# Patient Record
Sex: MALE | Race: WHITE | Employment: FULL TIME | URBAN - METROPOLITAN AREA
[De-identification: names, ages, dates, MRNs, and addresses within clinical notes are randomized per-mention and may not be internally consistent; named-entity substitution may affect disease eponyms.]

---

## 2018-05-04 ENCOUNTER — HOSPITAL ENCOUNTER (OUTPATIENT)
Dept: CT IMAGING | Age: 58
Discharge: HOME | End: 2018-05-06
Payer: COMMERCIAL

## 2018-05-04 ENCOUNTER — HOSPITAL ENCOUNTER (OUTPATIENT)
Age: 58
Discharge: HOME | End: 2018-05-06
Payer: COMMERCIAL

## 2018-05-04 DIAGNOSIS — F17.210 CIGARETTE SMOKER: ICD-10-CM

## 2018-05-04 PROCEDURE — G0297 LDCT FOR LUNG CA SCREEN: HCPCS

## 2018-05-07 ENCOUNTER — TELEPHONE (OUTPATIENT)
Dept: CASE MANAGEMENT | Age: 58
End: 2018-05-07

## 2018-11-07 ENCOUNTER — TELEPHONE (OUTPATIENT)
Dept: CASE MANAGEMENT | Age: 58
End: 2018-11-07

## 2018-11-07 NOTE — LETTER
CT Lung Screening/ Lung Nodule Program  [address] [facility]. [place], [address]       TO:   Good Mcdowell MD    FAX:   [phone]        FROM:   [place], [place] Lung Screening Program    DATE:   11/7/2018    PHONE:  [phone]    FAX:    [phone]      Comments: Follow up reminder  RE:  Alcira Ball  1960          The information contained in this fax message is intended only for Personal and Confidential use of the designated recipient(s) names above. This information is to be handled as Privileged and Confidential. If the reader of this message is not the intended recipient, you are hereby notified that you have received this document in error, and that any review, dissemination, distribution, or copying of this message is strictly prohibited. If you receive this communication in error, please notify us immediately at the above number and return the original message to us by mail. Thank you. Member of [facility]. Lung Nodule Center            11/7/2018        RE: Isaias Kebede  [address] [facility]      MD TERESA [address]/ [address] [address]      Dear Good Mcdowell MD,    Our records indicate your patient, Alcira Ball had a CT Lung Screening done on 5/4/18 and may be due for follow up scans (CT Chest without Contrast)  as recommended by the American College of Radiology's Lung Rads Assessment for Pulmonary Nodules. If the patient has had follow up testing done at another facility, please call us and let us know so that we may update our records. Thank you for your commitment to your patients and our [address].  If you have any additional questions or concerns regarding our CT Lung Screening Program or our Lung Nodule Program, please don't hesitate to call me at [phone].         Sincerely,    Pulmonary Nodule Center

## 2018-11-07 NOTE — TELEPHONE ENCOUNTER
No call, encounter opened to process CT Lung Screening.       CT Lung Screen 5/4/18 :  Impression   1. No focal consolidation, pleural effusion or pneumothorax. 2. Bilateral upper lobe pulmonary nodules as described above. Recommend follow-up as per Fleischner criteria below. 3. Minimal to mild pulmonary emphysematous disease. 4. Status post cholecystectomy.       Lung - RADS Category 3 :  Probably benign finding (s) - short term   follow - up suggested, includes nodules with a low likelihood of   becoming a clinically active cancer - Follow-up CT scan in 6 months.             Alvaro Fitzgerald is a  59 y.o. male who is a current smoker with a 45 pack year smoking history.      Letter mailed to patient  5/7/2018 encouraging him  to call his physician for results and follow up recommendations if needed. 11/7/2018 Reminder faxed to Adriana Thompson MD and mailed to patient on 11/7/2018.      PATSY Avila., R.T.(R)(T)  Lung Nodule Navigator  CT Lung Cancer Screening- [facility]

## 2019-02-11 ENCOUNTER — TELEPHONE (OUTPATIENT)
Dept: CASE MANAGEMENT | Age: 59
End: 2019-02-11

## 2019-03-05 ENCOUNTER — TELEPHONE (OUTPATIENT)
Dept: CASE MANAGEMENT | Age: 59
End: 2019-03-05

## 2019-03-25 ENCOUNTER — HOSPITAL ENCOUNTER (OUTPATIENT)
Dept: CT IMAGING | Age: 59
Discharge: HOME | End: 2019-03-27
Payer: COMMERCIAL

## 2019-03-25 DIAGNOSIS — R91.1 LUNG NODULE: ICD-10-CM

## 2019-03-25 DIAGNOSIS — F17.210 CIGARETTE SMOKER: ICD-10-CM

## 2019-03-25 PROCEDURE — 71250 CT THORAX DX C-: CPT

## 2019-05-03 ENCOUNTER — TELEPHONE (OUTPATIENT)
Dept: CASE MANAGEMENT | Age: 59
End: 2019-05-03

## 2019-05-03 NOTE — TELEPHONE ENCOUNTER
No call, encounter opened to process CT Lung Screening.       CT Lung Screen 5/4/18 :  Impression   1. No focal consolidation, pleural effusion or pneumothorax. 2. Bilateral upper lobe pulmonary nodules as described above. Recommend follow-up as per Fleischner criteria below. 3. Minimal to mild pulmonary emphysematous disease. 4. Status post cholecystectomy.       Lung - RADS Category 3 :  Probably benign finding (s) - short term   follow - up suggested, includes nodules with a low likelihood of   becoming a clinically active cancer - Follow-up CT scan in 6 months.             Tara Goldberg Coy is a  61 y. o. male who is a current smoker with a 45 pack year smoking history.      Letter mailed to patient  5/7/2018 encouraging him  to call Mandie Roa for results and follow up recommendations if needed.       11/7/2018 Reminder [facility], [address] [facility] mailed to patient on 11/7/2018.      2/11/2019 - Reminder mailed to patient.        3/5/2019 - Call to Velia Arevalo MD .  Office states patient is due in next week and they will discuss.       3/25/19 CT Chest:  Impression       1. Lung RADS category 2: Benign behavior or appearance. A one-year   follow-up chest CT is recommended for routine annual screening. 2. Coronary atherosclerotic disease.      Meryle Laud, B.S., R.T.(R)(T)  Lung Nodule Navigator  CT Lung Cancer Screening- [facility]

## 2020-07-06 ENCOUNTER — HOSPITAL ENCOUNTER (OUTPATIENT)
Dept: ULTRASOUND IMAGING | Age: 60
Discharge: HOME | End: 2020-07-08
Payer: COMMERCIAL

## 2020-07-06 ENCOUNTER — TELEPHONE (OUTPATIENT)
Dept: CASE MANAGEMENT | Age: 60
End: 2020-07-06

## 2020-07-06 ENCOUNTER — HOSPITAL ENCOUNTER (OUTPATIENT)
Dept: CT IMAGING | Age: 60
Discharge: HOME | End: 2020-07-08
Payer: COMMERCIAL

## 2020-07-06 VITALS — WEIGHT: 170 LBS | HEIGHT: 68 IN | BODY MASS INDEX: 25.76 KG/M2

## 2020-07-06 PROCEDURE — G0297 LDCT FOR LUNG CA SCREEN: HCPCS

## 2020-07-06 PROCEDURE — 76706 US ABDL AORTA SCREEN AAA: CPT

## 2020-07-06 NOTE — TELEPHONE ENCOUNTER
I called the patient and he confirmed his CT lung screening at [facility]  on 7/6/2020 at 4:00 pm.  I reminded the patient to arrive at 3:30 pm, enter through the main entrance, and register. Patient confirmed.             Electronically signed by Radha Belle on 7/6/20 at 10:48 AM EDT

## 2020-07-07 ENCOUNTER — TELEPHONE (OUTPATIENT)
Dept: CASE MANAGEMENT | Age: 60
End: 2020-07-07

## 2020-07-07 NOTE — TELEPHONE ENCOUNTER
No call, encounter opened to process CT Lung Screening. CT Lung Screen: 7/6/2020  Impression   A nodule identified in the left pulmonary apical region has increased   in size, approaching 9 mm maximum diameter. No other significant   findings are identified on either side. The left apical nodule is   approaching the size that should be visible by PET CT imaging, but   this is not desired more feasible at this time, reevaluation in 3   months is recommended for further characterization.           LUNG RADS CATEGORY: 4A- suspicious finding, with the recommendation of   3 month follow-up CT reevaluation, or PET CT scan in the near future     Pack years: 52    Social History     Tobacco Use  Smoking Status: Former Smoker    Start Date: 1972   Quit Date: 06/2019   Types: Cigarettes   Packs/Day: 1.00   Years: 47.00   Pack Years: 52   Smokeless Tobacco: Never Used         Results letter sent to patient via my chart or mailed.      [facility]

## 2020-07-08 ENCOUNTER — TELEPHONE (OUTPATIENT)
Dept: CASE MANAGEMENT | Age: 60
End: 2020-07-08

## 2020-07-08 NOTE — TELEPHONE ENCOUNTER
I called the patient's ordering provider Rip Helen Starr's office and left a message for him or his nurse to call the office back at [phone] to discuss care plan for the patient.       Electronically signed by Jim Kelley on 7/8/20 at 1:53 PM EDT

## 2020-10-19 ENCOUNTER — HOSPITAL ENCOUNTER (OUTPATIENT)
Dept: CT IMAGING | Age: 60
Discharge: HOME | End: 2020-10-21

## 2020-10-19 PROCEDURE — 6360000004 HC RX CONTRAST MEDICATION: Performed by: STUDENT IN AN ORGANIZED HEALTH CARE EDUCATION/TRAINING PROGRAM

## 2020-10-19 PROCEDURE — 2580000003 HC RX 258: Performed by: STUDENT IN AN ORGANIZED HEALTH CARE EDUCATION/TRAINING PROGRAM

## 2020-10-19 PROCEDURE — 71260 CT THORAX DX C+: CPT

## 2020-10-19 RX ORDER — SODIUM CHLORIDE 0.9 % (FLUSH) 0.9 %
10 SYRINGE (ML) INJECTION PRN
Status: DISCONTINUED | OUTPATIENT
Start: 2020-10-19 | End: 2020-10-22 | Stop reason: HOSPADM

## 2020-10-19 RX ADMIN — IOPAMIDOL 75 ML: 755 INJECTION, SOLUTION INTRAVENOUS at 08:23

## 2020-10-19 RX ADMIN — Medication 10 ML: at 08:23

## 2021-10-06 ENCOUNTER — HOSPITAL ENCOUNTER (OUTPATIENT)
Dept: DIABETES SERVICES | Age: 61
Setting detail: THERAPIES SERIES
Discharge: HOME | End: 2021-10-06
Payer: COMMERCIAL

## 2021-10-06 PROCEDURE — G0109 DIAB MANAGE TRN IND/GROUP: HCPCS

## 2021-10-07 ENCOUNTER — HOSPITAL ENCOUNTER (OUTPATIENT)
Dept: DIABETES SERVICES | Age: 61
Setting detail: THERAPIES SERIES
Discharge: HOME | End: 2021-10-07
Payer: COMMERCIAL

## 2021-10-07 PROCEDURE — G0109 DIAB MANAGE TRN IND/GROUP: HCPCS

## 2021-10-07 ASSESSMENT — PROBLEM AREAS IN DIABETES QUESTIONNAIRE (PAID)
COPING WITH COMPLICATIONS OF DIABETES: 0
FEELING DEPRESSED WHEN YOU THINK ABOUT LIVING WITH DIABETES: 1
WORRYING ABOUT THE FUTURE AND THE POSSIBILITY OF SERIOUS COMPLICATIONS: 0
PAID-5 TOTAL SCORE: 2
FEELING SCARED WHEN YOU THINK ABOUT LIVING WITH DIABETES: 1
FEELING THAT DIABETES IS TAKING UP TOO MUCH OF YOUR MENTAL AND PHYSICAL ENERGY EVERY DAY: 0

## 2021-10-07 NOTE — PROGRESS NOTES
Diabetes Self-Management Education Record    Participant Name: Audrey Donovan  Referring Provider: Selena Portillo MD  Assessment/Evaluation Ratings:  1=Needs Instruction   4=Demonstrates Understanding/Competency  2=Needs Review   NC=Not Covered    3=Comprehends Key Points  N/A=Not Applicable  Topics/Learning Objectives Pre-session Assess Date:  Instructor initials/date   Instr. Date  10/6/21 PC  Instructor initials/date Follow-up Post- session Eval Comments   Diabetes disease process & Treatment process:   -Define type of diabetes in simple terms.  - Describe the ABCs of  diabetes management  -Identify own type of diabetes  -Identify lifestyle changes/treatment options  -other:  1 [x] All     []  []  []  []  []  3  10/6/21 PC    Pt came in 20 minute late for class   Developing strategies for Healthy coping/psychosocial issues:    -Describe feelings about living with diabetes  -Identify coping strategies and sources of stress  -Identify support needed & support network available  -Complete PAID-5 Diabetes questionnaire 1 [x] All     []  []  []    []  [facility] completed a Diabetes Self- Management Education Assessment on 10/7/21. Part of our assessment is having the patient complete the PAID (Problem Areas in Diabetes Scale)-5 survey. This tool  measures diabetes-related emotional distress a patient may be feeling. Audrey Donovan scored _2_   A total score of >8 indicates possible diabetes related emotional distress, which warrants further assessment and a referral to mental health professional for psychological support and treatment.            Prevention, detection & treatment of Chronic complications:    -Identify the prevention, detection and treatment for complications including immunizations, preventive eye, foot, dental and renal exams as indicated per the participant's duration of diabetes and health status.  -Define the natural course of diabetes and the relationship of blood glucose levels to long term complications of diabetes. 2 [x] All     []            []  3    Prevention, detection & treatment of acute complications:    -State the causes,signs & symptoms of hyper & hypoglycemia, and prevention & treatment strategies.   -Describe sick day guidelines  DKA /indications for ketone testing &  when to call physician  1 [x] All     []      []    3              -Identify severe weather/situation crisis  & diabetes supplies management  []      Using medications safely:   -State effects of diabetes medicines on blood glucose levels;  -List diabetes medication taken, action & side effects 1 [] All     []  []  2    Insulin/Injectables/glucagon  -Name appropriate injection sites; proper storage; supplies needed;     []       Demonstrate proper technique  []      Monitoring blood glucose, interpreting and using results:   -Identify the purpose of testing   -Identify recommended & personal blood glucose targets & HgbA1C target levels  -State the Importance of logging blood glucose levels for pattern recognition;   -State benefits of reading/using pt generated health data  -Verbalize safe lancet disposal 1 [x] All     []  []    []  []  []  3 .    -Demonstrate proper testing technique  []      Incorporating physical activity into lifestyle:   -State effect of exercise on blood glucose levels;   -State benefits of regular exercise;   -Define safety considerations/food choices if needed.  -Describe contraindications/maintenance of activity.  1 [x] All     []  []    []  []  3    Incorporating nutritional management into lifestyle:   -Describe effect of type, amount & timing of food on blood glucose  -Describe methods for preparing and planning healthy meals  -Correctly read food labels  -Name 3 foods high in Carbohydrate 1 [] All       []    []    []  []      -Plan a carbohydrate-controlled meal based on individualized meal plan  -Demonstrate CHO counting/portion control  1 []  []      Developing strategies for problem solving to promote health/change behavior. -Identify 7 self-care behaviors; Personal health risk factors; Benefits, challenges & strategies for behavioral change and set an individualized goal selection. 1       []      []Nutrition  []Monitoring  []Exercise  []Medication  []Other     Identified Barriers to learning/adherence to self management plan:    None  []  other    Instruction Method:  Lecture/Discussion and Handouts    Supporting Education Materials/Equipment Provided: Self-management manual and Nutritional Packet   []Mexican materials       [] services     []Other:      Encounter Type Date Attended Start Time End Time Comments No Show Dates   Assessment          Session 1 10/6/21 PC 36 2000  in person    Session 2 10/7/21 [address] 1730 2000 In person    1:1 DSMES          In person Follow-up         Gestational Diabetes         Individual MNT        Meter Instrx        Insulin Instrx           Additional Comments: [] Pt seen individually due to Covid-19 Safety precautions and no group session available.         Date:   Follow-up goal attainment based on patients initial DSMES goal    Dr Notified by [] EMR []Fax        []Post class Hgb A1C  []Medication compliance   []Plate method/meal plan compliance   []Able to state the number of Carbohydrate servings eaten at B,L,D   []Testing blood glucose as prescribed by PCP   []Exercise Routine   []Other:   []Other:     []Patient lost to follow-up  Dr Notified by []EMR []Fax     Personal Support Plan:      [] Keep all scheduled doctor appointments   [] Make and keep appointments with specialists (foot, eye, dentist) as recommended   [] Consult my pharmacist about all new medications or to ask any medication questions   [] Get tested for sleep apnea   [] Seek help for:   [] Make an appointment with:   [] Attend smoking cessation classes or call 1-800-QUIT-NOW  [] Attend Diabetes Support Group   [] Use diabetes magazines, books, or credible web-sites like the ADA for more information  [] Increase exercise at home or join an exercise program:   [] Other:

## 2021-10-07 NOTE — LETTER
[facility])- Diabetes Education Department Initial Diabetes Education Letter    10/7/2021       Re:     Paul Gallo         :  1960  Dear Dr. Leonor Miller: Thank you for referring your patient, Paul Gallo, for diabetes education. Paul Gallo has completed their comprehensive education plan today which included the topics below:    Nursing/Medical [x]      Nutrition [x]  [] Diabetes disease process & treatment   [] Diabetes medication use and safety   [] Self-monitoring blood glucose/interpreting results  [] Prevention, detection and treatment of acute complications  [] Prevention, detection and treatment of chronic complications  [] Developing strategies to address psychosocial issues    [] Nutritional management: basic principles   [] Carbohydrate counting, plate method, label reading  [] Benefits of/ways to incorporate physical activity  [] Problem solving and preparing for crisis situations  [] Diabetes supply management  [] Goal setting and behavior modification           PAID -5 (Problem Areas in Diabetes) Survey Results  2  A total score of 8 or greater indicates possible diabetes related emotional distress which warrants further assessment.  [] [address] and Crisis Resource information provided. Comments: 10/7/21KC: Educated on Diabetes Plate Method and healthy food choices. Demonstrated understanding of carb counting using food lists with carbohydrate serving sizes. Pt instructed on 3-4 carbohydrate servings per meal with 1 carbohydrate serving HS. Read CHO content of food correctly on nutrition facts using food label. Discussed lean proteins, low fat, and high fiber. Reviewed low sodium and avoiding added sugars. Reviewed portion sizes and benefits of measuring foods. Patient will be called and was encouraged to meet with a dietitian for individualized meal plan.     PATIENT SELECTED GOAL:   [x]  I will follow my meal plan and measure my carbohydrate foods. []  I will increase my activity to:  []  I will check my blood glucose as ordered by my doctor. []  I will take my medications at the correct times as ordered by my doctor. []  Other:      DIABETES SELF-MANAGEMENT SUPPORT PLAN/REFERRALS (patient identified):  [] Keep my scheduled visits with my doctor   [] Make and keep appointments with specialists (foot, eye, dentist) as recommended  [] Consult my pharmacist with all new medications and/or any medication questions  [] Get tested for sleep apnea  [] Seek help for:   [] Make an appointment with:   [] Attend smoking cessation classes or call help-line (3-811-QUIT-NOW; [phone])  [] Attend a diabetes support group  [] Use diabetes magazines, books, or the American Diabetes Association website (www.diabetes. org) for more information    [] Start or increase exercising at home or join a program:  [] Other: There will be a follow-up in 3 months to evaluate the attainment of their chosen goal, and self identified support plan and you will be notified of their progress. Thank you for referring this patient to our program.  Please do not hesitate to call if you have any questions at [phone] [facility] or [facility]) or [phone] ([address] [place] [address]).         Sincerely,    [facility]) Diabetes Education Department  American Diabetes Association Recognized DSMES Program

## 2021-10-22 ENCOUNTER — HOSPITAL ENCOUNTER (OUTPATIENT)
Dept: DIABETES SERVICES | Age: 61
Setting detail: THERAPIES SERIES
Discharge: HOME | End: 2021-10-22
Payer: COMMERCIAL

## 2021-10-22 PROCEDURE — G0108 DIAB MANAGE TRN  PER INDIV: HCPCS

## 2021-10-22 NOTE — LETTER
to comply with meal plan. Encouraged pt to use measuring cups for portion control. Instructed pt to call with any questions. Patient is encouraged to call with further questions or call and re-schedule other sessions within a year from original date. Thank you for referring this patient to our program.  Please do not hesitate to call if you have any questions at ( (NANCY or MUKESH) or [phone] ([address] [place] [address]).         Sincerely,         Registered Dietitian Nutritionists:          []  ARMINDA Cuellar          [x]   Guanaco Lara MS ARMINDA BARCENAS   []  ARMINDA Trinh  []   ARMINDA Estevez           Diabetes

## 2021-10-22 NOTE — PROGRESS NOTES
Diabetes Self-Management Education Record    Participant Name: Ernesto Smyth  Referring Provider: Lori Sales MD  Assessment/Evaluation Ratings:  1=Needs Instruction   4=Demonstrates Understanding/Competency  2=Needs Review   NC=Not Covered    3=Comprehends Key Points  N/A=Not Applicable  Topics/Learning Objectives Pre-session Assess Date:  Instructor initials/date   Instr. Date  10/6/21 PC  Instructor initials/date Follow-up Post- session Eval Comments   Diabetes disease process & Treatment process:   -Define type of diabetes in simple terms.  - Describe the ABCs of  diabetes management  -Identify own type of diabetes  -Identify lifestyle changes/treatment options  -other:  1 [x] All     []  []  []  []  []  3  10/6/21 PC    Pt came in 20 minute late for class   Developing strategies for Healthy coping/psychosocial issues:    -Describe feelings about living with diabetes  -Identify coping strategies and sources of stress  -Identify support needed & support network available  -Complete PAID-5 Diabetes questionnaire 1 [x] All     []  []  []    []  [facility] completed a Diabetes Self- Management Education Assessment on 10/7/21. Part of our assessment is having the patient complete the PAID (Problem Areas in Diabetes Scale)-5 survey. This tool  measures diabetes-related emotional distress a patient may be feeling. Ernesto Smyth scored _2_   A total score of >8 indicates possible diabetes related emotional distress, which warrants further assessment and a referral to mental health professional for psychological support and treatment.            Prevention, detection & treatment of Chronic complications:    -Identify the prevention, detection and treatment for complications including immunizations, preventive eye, foot, dental and renal exams as indicated per the participant's duration of diabetes and health status.  -Define the natural course of diabetes and the relationship of blood glucose levels to long term complications of diabetes. 2 [x] All     []            []  3    Prevention, detection & treatment of acute complications:    -State the causes,signs & symptoms of hyper & hypoglycemia, and prevention & treatment strategies.   -Describe sick day guidelines  DKA /indications for ketone testing &  when to call physician  1 [x] All     []      []    3              -Identify severe weather/situation crisis  & diabetes supplies management  []      Using medications safely:   -State effects of diabetes medicines on blood glucose levels;  -List diabetes medication taken, action & side effects 1 [] All     []  []  2    Insulin/Injectables/glucagon  -Name appropriate injection sites; proper storage; supplies needed;     []       Demonstrate proper technique  []      Monitoring blood glucose, interpreting and using results:   -Identify the purpose of testing   -Identify recommended & personal blood glucose targets & HgbA1C target levels  -State the Importance of logging blood glucose levels for pattern recognition;   -State benefits of reading/using pt generated health data  -Verbalize safe lancet disposal 1 [x] All     []  []    []  []  []  3 .    -Demonstrate proper testing technique  []      Incorporating physical activity into lifestyle:   -State effect of exercise on blood glucose levels;   -State benefits of regular exercise;   -Define safety considerations/food choices if needed.  -Describe contraindications/maintenance of activity. 1 [x] All     []  []    []  []  3    Incorporating nutritional management into lifestyle:   -Describe effect of type, amount & timing of food on blood glucose  -Describe methods for preparing and planning healthy meals  -Correctly read food labels  -Name 3 foods high in Carbohydrate 1 [x] All       [x]    [x]    [x]  [x]  3 10/7/21KC: Educated on Diabetes Plate Method and healthy food choices. Demonstrated understanding of carb counting using food lists with carbohydrate serving sizes. Pt instructed on 3-4 carbohydrate servings per meal with 1 carbohydrate serving HS. Read CHO content of food correctly on nutrition facts using food label. Discussed lean proteins, low fat, and high fiber. Reviewed low sodium and avoiding added sugars. Reviewed portion sizes and benefits of measuring foods. Patient will be called and was encouraged to meet with a dietitian for individualized meal plan. -Plan a carbohydrate-controlled meal based on individualized meal plan  -Demonstrate CHO counting/portion control  1 [x]  [x]  3 10/22/21KC: Instructed pt on 1600 calorie diet with 12 total carbohydrate servings per day. 3 carbohydrate choices for breakfast, 3 carbohydrate choices for lunch, 4 carbohydrate choices for dinner, 1 carbohydrate choices for PM/HS snack. Discussed recommended weight goals and water intakes. Discussed different types of carbohydrates and choosing higher quality carbohydrates with more fiber. Educated pt on lean proteins and low fat. Educated pt on label reading and how to apply grams of total carbohydrates into carbohydrate servings to comply with meal plan. Encouraged pt to use measuring cups for portion control. Instructed pt to call with any questions. Developing strategies for problem solving to promote health/change behavior. -Identify 7 self-care behaviors; Personal health risk factors; Benefits, challenges & strategies for behavioral change and set an individualized goal selection.  1       [x]  3  10/7/21KC  [x]Nutrition  []Monitoring  []Exercise  []Medication  []Other     Identified Barriers to learning/adherence to self management plan:    None  []  other    Instruction Method:  Lecture/Discussion and Handouts    Supporting Education Materials/Equipment Provided: Self-management manual and Nutritional Packet   []Maltese materials       [] services     []Other:      Encounter Type Date Attended Start Time End Time Comments No Show Dates   Assessment          Session 1 10/6/21 PC 1800 2000  in person    Session 2 10/7/21 [address] 1800 2000 In person    Session 3 10/22/21KC 1200 1230 telehealth    In person Follow-up         Gestational Diabetes         Individual MNT        Meter Instrx        Insulin Instrx           Additional Comments: [] Pt seen individually due to Covid-19 Safety precautions and no group session available.     Patient came 30 minutes late to both class days    Date:   Follow-up goal attainment based on patients initial DSMES goal    Dr Notified by [] EMR []Fax        []Post class Hgb A1C  []Medication compliance   []Plate method/meal plan compliance   []Able to state the number of Carbohydrate servings eaten at B,L,D   []Testing blood glucose as prescribed by PCP   []Exercise Routine   []Other:   []Other:     []Patient lost to follow-up   Notified by []EMR []Fax     Personal Support Plan:      [] Keep all scheduled doctor appointments   [] Make and keep appointments with specialists (foot, eye, dentist) as recommended   [] Consult my pharmacist about all new medications or to ask any medication questions   [] Get tested for sleep apnea   [] Seek help for:   [] Make an appointment with:   [] Attend smoking cessation classes or call 7-800-QUIT-NOW  [] Attend Diabetes Support Group   [] Use diabetes magazines, books, or credible web-sites like the ADA for more information  [] Increase exercise at home or join an exercise program:   [] Other:

## 2021-11-11 ENCOUNTER — TELEPHONE (OUTPATIENT)
Dept: DIABETES SERVICES | Age: 61
End: 2021-11-11

## 2021-11-13 ENCOUNTER — HOSPITAL ENCOUNTER (OUTPATIENT)
Dept: ULTRASOUND IMAGING | Age: 61
Discharge: HOME | End: 2021-11-15
Payer: COMMERCIAL

## 2021-11-13 ENCOUNTER — HOSPITAL ENCOUNTER (OUTPATIENT)
Age: 61
Discharge: HOME | End: 2021-11-13
Payer: COMMERCIAL

## 2021-11-13 ENCOUNTER — HOSPITAL ENCOUNTER (OUTPATIENT)
Dept: CT IMAGING | Age: 61
Discharge: HOME | End: 2021-11-15
Payer: COMMERCIAL

## 2021-11-13 DIAGNOSIS — R91.1 SOLITARY LUNG NODULE: ICD-10-CM

## 2021-11-13 DIAGNOSIS — Z13.6 ENCOUNTER FOR SCREENING FOR CARDIOVASCULAR DISORDERS: ICD-10-CM

## 2021-11-13 LAB
ANION GAP SERPL CALCULATED.3IONS-SCNC: 10 MMOL/L (ref 7–16)
BUN BLDV-MCNC: 22 MG/DL (ref 6–23)
CALCIUM SERPL-MCNC: 9.6 MG/DL (ref 8.6–10.2)
CHLORIDE BLD-SCNC: 104 MMOL/L (ref 98–107)
CO2: 25 MMOL/L (ref 22–29)
CREAT SERPL-MCNC: 1 MG/DL (ref 0.7–1.2)
GFR AFRICAN AMERICAN: >60
GFR NON-AFRICAN AMERICAN: >60 ML/MIN/1.73
GLUCOSE BLD-MCNC: 135 MG/DL (ref 74–99)
POTASSIUM SERPL-SCNC: 4.7 MMOL/L (ref 3.5–5)
SODIUM BLD-SCNC: 139 MMOL/L (ref 132–146)

## 2021-11-13 PROCEDURE — 80048 BASIC METABOLIC PNL TOTAL CA: CPT

## 2021-11-13 PROCEDURE — 71260 CT THORAX DX C+: CPT

## 2021-11-13 PROCEDURE — 6360000004 HC RX CONTRAST MEDICATION: Performed by: RADIOLOGY

## 2021-11-13 PROCEDURE — 76775 US EXAM ABDO BACK WALL LIM: CPT

## 2021-11-13 PROCEDURE — 36415 COLL VENOUS BLD VENIPUNCTURE: CPT

## 2021-11-13 RX ADMIN — IOPAMIDOL 75 ML: 755 INJECTION, SOLUTION INTRAVENOUS at 09:22

## 2021-12-20 ENCOUNTER — TELEPHONE (OUTPATIENT)
Dept: VASCULAR SURGERY | Age: 61
End: 2021-12-20

## 2021-12-21 ENCOUNTER — OFFICE VISIT (OUTPATIENT)
Dept: VASCULAR SURGERY | Age: 61
End: 2021-12-21
Payer: COMMERCIAL

## 2021-12-21 VITALS
RESPIRATION RATE: 18 BRPM | BODY MASS INDEX: 25.46 KG/M2 | HEART RATE: 94 BPM | HEIGHT: 68 IN | WEIGHT: 168 LBS | SYSTOLIC BLOOD PRESSURE: 126 MMHG | OXYGEN SATURATION: 98 % | DIASTOLIC BLOOD PRESSURE: 78 MMHG

## 2021-12-21 DIAGNOSIS — I71.40 AAA (ABDOMINAL AORTIC ANEURYSM) WITHOUT RUPTURE: Primary | ICD-10-CM

## 2021-12-21 PROCEDURE — 99204 OFFICE O/P NEW MOD 45 MIN: CPT | Performed by: SURGERY

## 2021-12-21 RX ORDER — SIMVASTATIN 5 MG
5 TABLET ORAL NIGHTLY
COMMUNITY
End: 2022-09-13 | Stop reason: ALTCHOICE

## 2021-12-21 NOTE — PROGRESS NOTES
Vascular Surgery Outpatient Consultation      Chief Complaint   Patient presents with    Consultation     AAA,        Reason for Consult:  Abdominal aortic aneursym    Requesting Physician:  Dr. Nathanael Pisano:                The patient is a 59 y.o. male who is referred for evaluation of abdominal aortic aneursym. He was being followed by Dr. Khadra Chaparro for the AAA, but has been referred since the aneurysm is now 4.8 cm. Past Medical History:    History reviewed. No pertinent past medical history. Past Surgical History:    History reviewed. No pertinent surgical history. Current Medications:   Prior to Admission medications    Medication Sig Start Date End Date Taking?  Authorizing Provider   simvastatin (ZOCOR) 5 MG tablet Take 5 mg by mouth nightly    Historical Provider, MD   metFORMIN (GLUCOPHAGE) 500 MG tablet Take 500 mg by mouth 2 times daily 21   Historical Provider, MD     Allergies:  Penicillins    Social History     Socioeconomic History    Marital status:      Spouse name: Not on file    Number of children: Not on file    Years of education: Not on file    Highest education level: Not on file   Occupational History    Not on file   Tobacco Use    Smoking status: Former Smoker     Packs/day: 1.00     Years: 47.00     Pack years: 47.00     Types: Cigarettes     Start date: 0     Quit date: 2019     Years since quittin.5    Smokeless tobacco: Never Used    Tobacco comment: Per CT Lung Screening order   Vaping Use    Vaping Use: Never used   Substance and Sexual Activity    Alcohol use: Not Currently    Drug use: Not Currently    Sexual activity: Not on file   Other Topics Concern    Not on file   Social History Narrative    Not on file     Social Determinants of Health     Financial Resource Strain:     Difficulty of Paying Living Expenses: Not on file   Food Insecurity:     Worried About Running Out of Food in the Last Year: Not on file   Catherine Qureshi Ran Out of Food in the Last Year: Not on file   Transportation Needs:     Lack of Transportation (Medical): Not on file    Lack of Transportation (Non-Medical): Not on file   Physical Activity:     Days of Exercise per Week: Not on file    Minutes of Exercise per Session: Not on file   Stress:     Feeling of Stress : Not on file   Social Connections:     Frequency of Communication with Friends and Family: Not on file    Frequency of Social Gatherings with Friends and Family: Not on file    Attends Rastafari Services: Not on file    Active Member of [address] [place] [address] [employer] or Organizations: Not on file    Attends Club or Organization Meetings: Not on file    Marital Status: Not on file   Intimate Partner Violence:     Fear of Current or Ex-Partner: Not on file    Emotionally Abused: Not on file    Physically Abused: Not on file    Sexually Abused: Not on file   Housing Stability:     Unable to Pay for Housing in the Last Year: Not on file    Number of [place] in the Last Year: Not on file    Unstable Housing in the Last Year: Not on file        History reviewed. No pertinent family history.     REVIEW OF SYSTEMS (New symptoms):    Eyes:      Blurred vision:  No [x]/Yes []               Diplopia:   No [x]/Yes []               Vision loss:       No [x]/Yes []   Ears, nose, throat:             Hearing loss:    No [x]/Yes []      Vertigo:   No [x]/Yes []                       Swallowing problem:  No [x]/Yes []               Nose bleeds:   No [x]/Yes []      Voice hoarseness:  No [x]/Yes []  Respiratory:             Cough:   No [x]/Yes []      Pleuritic chest pain:  No [x]/Yes []                        Dyspnea:   No [x]/Yes []      Wheezing:   No [x]/Yes []  Cardiovascular:             Angina:   No [x]/Yes []      Palpitations:   No [x]/Yes []          Claudication:    No [x]/Yes []      Leg swelling:   No [x]/Yes []  Gastrointestinal:             Nausea or vomiting:  No [x]/Yes []               Abdominal pain:  No [x]/Yes []                     Intestinal bleeding: No [x]/Yes []  Musculoskeletal:             Leg pain:   No [x]/Yes []      Back pain:   No [x]/Yes []                    Weakness:   No [x]/Yes []  Neurologic:             Numbness:   No [x]/Yes []      Paralysis:   No [x]/Yes []                       Headaches:   No [x]/Yes []  Hematologic, lymphatic:   Anemia:   No [x]/Yes []              Bleeding or bruising:  No [x]/Yes []              Fevers or chills: No [x]/Yes []  Endocrine:             Temp intolerance:   No [x]/Yes []                       Polydipsia, polyuria:  No [x]/Yes []  Skin:              Rash:    No [x]/Yes []      Ulcers:   No [x]/Yes []              Abnorm pigment: No [x]/Yes []  :              Frequency/urgency:  No [x]/Yes []      Hematuria:    No [x]/Yes []                      Incontinence:    No [x]/Yes []    PHYSICAL EXAM:  Vitals:    12/21/21 1501   BP: 126/78   Pulse: 94   Resp: 18   SpO2: 98%     General Appearance: alert and oriented to person, place and time, well developed and well- nourished, in no acute distress  Skin: warm and dry, no rash or erythema  Head: normocephalic and atraumatic  Eyes: extraocular eye movements intact, conjunctivae normal  ENT: external ear and ear canal normal bilaterally, nose without deformity  Pulmonary/Chest: clear to auscultation bilaterally- no wheezes, rales or rhonchi, normal air movement, no respiratory distress  Cardiovascular: normal rate, regular rhythm, normal S1 and S2, no murmurs, no carotid bruits  Abdomen: soft, non-tender, non-distended, normal bowel sounds, no masses or organomegaly  Musculoskeletal: normal range of motion, no joint swelling, deformity or tenderness  Neurologic: no cranial nerve deficit, gait, coordination and speech normal  Extremities: no leg edema bilaterally    PULSE EXAM      Right      Left   Brachial     Radial     Femoral     Popliteal     Dorsalis Pedis     Posterior Tibial     (3=normal, 2=diminished,

## 2022-01-19 NOTE — PROGRESS NOTES
Diabetes Self-Management Education Record    Participant Name: Jeniffer Spears  Referring Provider: Nicolás Espinosa MD  Assessment/Evaluation Ratings:  1=Needs Instruction   4=Demonstrates Understanding/Competency  2=Needs Review   NC=Not Covered    3=Comprehends Key Points  N/A=Not Applicable  Topics/Learning Objectives Pre-session Assess Date:  Instructor initials/date   Instr. Date  10/6/21 PC  Instructor initials/date Follow-up Post- session Eval Comments   Diabetes disease process & Treatment process:   -Define type of diabetes in simple terms.  - Describe the ABCs of  diabetes management  -Identify own type of diabetes  -Identify lifestyle changes/treatment options  -other:  1 [x] All     []  []  []  []  []  3  10/6/21 PC    Pt came in 20 minute late for class   Developing strategies for Healthy coping/psychosocial issues:    -Describe feelings about living with diabetes  -Identify coping strategies and sources of stress  -Identify support needed & support network available  -Complete PAID-5 Diabetes questionnaire 1 [x] All     []  []  []    []  [facility] completed a Diabetes Self- Management Education Assessment on 10/7/21. Part of our assessment is having the patient complete the PAID (Problem Areas in Diabetes Scale)-5 survey. This tool  measures diabetes-related emotional distress a patient may be feeling. Jeniffer Spears scored _2_   A total score of >8 indicates possible diabetes related emotional distress, which warrants further assessment and a referral to mental health professional for psychological support and treatment.            Prevention, detection & treatment of Chronic complications:    -Identify the prevention, detection and treatment for complications including immunizations, preventive eye, foot, dental and renal exams as indicated per the participant's duration of diabetes and health status.  -Define the natural course of diabetes and the relationship of blood glucose levels to long term complications of diabetes. 2 [x] All     []            []  3    Prevention, detection & treatment of acute complications:    -State the causes,signs & symptoms of hyper & hypoglycemia, and prevention & treatment strategies.   -Describe sick day guidelines  DKA /indications for ketone testing &  when to call physician  1 [x] All     []      []    3              -Identify severe weather/situation crisis  & diabetes supplies management  []      Using medications safely:   -State effects of diabetes medicines on blood glucose levels;  -List diabetes medication taken, action & side effects 1 [] All     []  []  2    Insulin/Injectables/glucagon  -Name appropriate injection sites; proper storage; supplies needed;     []       Demonstrate proper technique  []      Monitoring blood glucose, interpreting and using results:   -Identify the purpose of testing   -Identify recommended & personal blood glucose targets & HgbA1C target levels  -State the Importance of logging blood glucose levels for pattern recognition;   -State benefits of reading/using pt generated health data  -Verbalize safe lancet disposal 1 [x] All     []  []    []  []  []  3 .    -Demonstrate proper testing technique  []      Incorporating physical activity into lifestyle:   -State effect of exercise on blood glucose levels;   -State benefits of regular exercise;   -Define safety considerations/food choices if needed.  -Describe contraindications/maintenance of activity. 1 [x] All     []  []    []  []  3    Incorporating nutritional management into lifestyle:   -Describe effect of type, amount & timing of food on blood glucose  -Describe methods for preparing and planning healthy meals  -Correctly read food labels  -Name 3 foods high in Carbohydrate 1 [x] All       [x]    [x]    [x]  [x]  3 10/7/21KC: Educated on Diabetes Plate Method and healthy food choices. Demonstrated understanding of carb counting using food lists with carbohydrate serving sizes. Pt instructed on 3-4 carbohydrate servings per meal with 1 carbohydrate serving HS. Read CHO content of food correctly on nutrition facts using food label. Discussed lean proteins, low fat, and high fiber. Reviewed low sodium and avoiding added sugars. Reviewed portion sizes and benefits of measuring foods. Patient will be called and was encouraged to meet with a dietitian for individualized meal plan. -Plan a carbohydrate-controlled meal based on individualized meal plan  -Demonstrate CHO counting/portion control  1 [x]  [x]  3 10/22/21KC: Instructed pt on 1600 calorie diet with 12 total carbohydrate servings per day. 3 carbohydrate choices for breakfast, 3 carbohydrate choices for lunch, 4 carbohydrate choices for dinner, 1 carbohydrate choices for PM/HS snack. Discussed recommended weight goals and water intakes. Discussed different types of carbohydrates and choosing higher quality carbohydrates with more fiber. Educated pt on lean proteins and low fat. Educated pt on label reading and how to apply grams of total carbohydrates into carbohydrate servings to comply with meal plan. Encouraged pt to use measuring cups for portion control. Instructed pt to call with any questions. Developing strategies for problem solving to promote health/change behavior. -Identify 7 self-care behaviors; Personal health risk factors; Benefits, challenges & strategies for behavioral change and set an individualized goal selection.  1       [x]  3  10/7/21KC  [x]Nutrition  []Monitoring  []Exercise  []Medication  []Other     Identified Barriers to learning/adherence to self management plan:    None  []  other    Instruction Method:  Lecture/Discussion and Handouts    Supporting Education Materials/Equipment Provided: Self-management manual and Nutritional Packet   []Pakistani materials       [] services     []Other:      Encounter Type Date Attended Start Time End Time Comments No Show Dates   Assessment          Session 1 10/6/21 PC 1800 2000  in person    Session 2 10/7/21 [address] 1800 2000 In person    Session 3 10/22/21KC 1200 1230 telehealth    In person Follow-up         Gestational Diabetes         Individual MNT        Meter Instrx        Insulin Instrx           Additional Comments: [] Pt seen individually due to Covid-19 Safety precautions and no group session available.     Patient came 30 minutes late to both class days    Date:  1/10/22 Follow-up goal attainment based on patients initial DSMES goal   Met  Dr Notified by [x] EMR []Fax        []Post class Hgb A1C  []Medication compliance   [x]Plate method/meal plan compliance   []Able to state the number of Carbohydrate servings eaten at B,L,D   []Testing blood glucose as prescribed by PCP   []Exercise Routine   []Other:   []Other:     []Patient lost to follow-up  Dr Notified by []EMR []Fax     Personal Support Plan:      [x] Keep all scheduled doctor appointments   [] Make and keep appointments with specialists (foot, eye, dentist) as recommended   [] Consult my pharmacist about all new medications or to ask any medication questions   [] Get tested for sleep apnea   [] Seek help for:   [] Make an appointment with:   [] Attend smoking cessation classes or call 1-800-QUIT-NOW  [] Attend Diabetes Support Group   [] Use diabetes magazines, books, or credible web-sites like the ADA for more information  [] Increase exercise at home or join an exercise program:   [] Other:

## 2022-04-13 ENCOUNTER — HOSPITAL ENCOUNTER (EMERGENCY)
Age: 62
Discharge: HOME | End: 2022-04-13
Attending: EMERGENCY MEDICINE
Payer: COMMERCIAL

## 2022-04-13 ENCOUNTER — APPOINTMENT (OUTPATIENT)
Dept: GENERAL RADIOLOGY | Age: 62
End: 2022-04-13
Payer: COMMERCIAL

## 2022-04-13 VITALS
WEIGHT: 169 LBS | DIASTOLIC BLOOD PRESSURE: 64 MMHG | HEIGHT: 68 IN | HEART RATE: 94 BPM | SYSTOLIC BLOOD PRESSURE: 108 MMHG | TEMPERATURE: 97.9 F | OXYGEN SATURATION: 98 % | RESPIRATION RATE: 16 BRPM | BODY MASS INDEX: 25.61 KG/M2

## 2022-04-13 DIAGNOSIS — R11.2 NAUSEA VOMITING AND DIARRHEA: Primary | ICD-10-CM

## 2022-04-13 DIAGNOSIS — R19.7 NAUSEA VOMITING AND DIARRHEA: Primary | ICD-10-CM

## 2022-04-13 DIAGNOSIS — R91.8 MASS OF LEFT LUNG: ICD-10-CM

## 2022-04-13 LAB
ALBUMIN SERPL-MCNC: 4.4 G/DL (ref 3.5–5.2)
ALP BLD-CCNC: 75 U/L (ref 40–129)
ALT SERPL-CCNC: 47 U/L (ref 0–40)
ANION GAP SERPL CALCULATED.3IONS-SCNC: 16 MMOL/L (ref 7–16)
APTT: 27.9 SEC (ref 24.5–35.1)
AST SERPL-CCNC: 29 U/L (ref 0–39)
BASOPHILS ABSOLUTE: 0.01 E9/L (ref 0–0.2)
BASOPHILS RELATIVE PERCENT: 0.1 % (ref 0–2)
BILIRUB SERPL-MCNC: 0.8 MG/DL (ref 0–1.2)
BUN BLDV-MCNC: 32 MG/DL (ref 6–23)
CALCIUM SERPL-MCNC: 9.7 MG/DL (ref 8.6–10.2)
CHLORIDE BLD-SCNC: 95 MMOL/L (ref 98–107)
CO2: 20 MMOL/L (ref 22–29)
CREAT SERPL-MCNC: 1.1 MG/DL (ref 0.7–1.2)
EOSINOPHILS ABSOLUTE: 0 E9/L (ref 0.05–0.5)
EOSINOPHILS RELATIVE PERCENT: 0 % (ref 0–6)
GFR AFRICAN AMERICAN: >60
GFR NON-AFRICAN AMERICAN: >60 ML/MIN/1.73
GLUCOSE BLD-MCNC: 206 MG/DL (ref 74–99)
HCT VFR BLD CALC: 42.6 % (ref 37–54)
HEMOGLOBIN: 15.1 G/DL (ref 12.5–16.5)
IMMATURE GRANULOCYTES #: 0.06 E9/L
IMMATURE GRANULOCYTES %: 0.5 % (ref 0–5)
INR BLD: 1
LACTIC ACID, SEPSIS: 1.2 MMOL/L (ref 0.5–1.9)
LIPASE: 50 U/L (ref 13–60)
LYMPHOCYTES ABSOLUTE: 1.43 E9/L (ref 1.5–4)
LYMPHOCYTES RELATIVE PERCENT: 12.3 % (ref 20–42)
MAGNESIUM: 1.9 MG/DL (ref 1.6–2.6)
MCH RBC QN AUTO: 28.7 PG (ref 26–35)
MCHC RBC AUTO-ENTMCNC: 35.4 % (ref 32–34.5)
MCV RBC AUTO: 81 FL (ref 80–99.9)
MONOCYTES ABSOLUTE: 0.52 E9/L (ref 0.1–0.95)
MONOCYTES RELATIVE PERCENT: 4.5 % (ref 2–12)
NEUTROPHILS ABSOLUTE: 9.56 E9/L (ref 1.8–7.3)
NEUTROPHILS RELATIVE PERCENT: 82.6 % (ref 43–80)
PDW BLD-RTO: 13.2 FL (ref 11.5–15)
PLATELET # BLD: 265 E9/L (ref 130–450)
PMV BLD AUTO: 10.3 FL (ref 7–12)
POTASSIUM SERPL-SCNC: 4.7 MMOL/L (ref 3.5–5)
PROTHROMBIN TIME: 10.8 SEC (ref 9.3–12.4)
RBC # BLD: 5.26 E12/L (ref 3.8–5.8)
SODIUM BLD-SCNC: 131 MMOL/L (ref 132–146)
TOTAL PROTEIN: 7.5 G/DL (ref 6.4–8.3)
WBC # BLD: 11.6 E9/L (ref 4.5–11.5)

## 2022-04-13 PROCEDURE — 83735 ASSAY OF MAGNESIUM: CPT

## 2022-04-13 PROCEDURE — 85025 COMPLETE CBC W/AUTO DIFF WBC: CPT

## 2022-04-13 PROCEDURE — 6370000000 HC RX 637 (ALT 250 FOR IP): Performed by: NURSE PRACTITIONER

## 2022-04-13 PROCEDURE — 36415 COLL VENOUS BLD VENIPUNCTURE: CPT

## 2022-04-13 PROCEDURE — 99283 EMERGENCY DEPT VISIT LOW MDM: CPT

## 2022-04-13 PROCEDURE — 85610 PROTHROMBIN TIME: CPT

## 2022-04-13 PROCEDURE — 71046 X-RAY EXAM CHEST 2 VIEWS: CPT

## 2022-04-13 PROCEDURE — 2580000003 HC RX 258: Performed by: EMERGENCY MEDICINE

## 2022-04-13 PROCEDURE — 96360 HYDRATION IV INFUSION INIT: CPT

## 2022-04-13 PROCEDURE — 83605 ASSAY OF LACTIC ACID: CPT

## 2022-04-13 PROCEDURE — 83690 ASSAY OF LIPASE: CPT

## 2022-04-13 PROCEDURE — 93005 ELECTROCARDIOGRAM TRACING: CPT | Performed by: NURSE PRACTITIONER

## 2022-04-13 PROCEDURE — 85730 THROMBOPLASTIN TIME PARTIAL: CPT

## 2022-04-13 PROCEDURE — 80053 COMPREHEN METABOLIC PANEL: CPT

## 2022-04-13 RX ORDER — ONDANSETRON 4 MG/1
4 TABLET, ORALLY DISINTEGRATING ORAL ONCE
Status: COMPLETED | OUTPATIENT
Start: 2022-04-13 | End: 2022-04-13

## 2022-04-13 RX ORDER — 0.9 % SODIUM CHLORIDE 0.9 %
1000 INTRAVENOUS SOLUTION INTRAVENOUS ONCE
Status: COMPLETED | OUTPATIENT
Start: 2022-04-13 | End: 2022-04-13

## 2022-04-13 RX ADMIN — ONDANSETRON 4 MG: 4 TABLET, ORALLY DISINTEGRATING ORAL at 18:11

## 2022-04-13 RX ADMIN — SODIUM CHLORIDE 1000 ML: 9 INJECTION, SOLUTION INTRAVENOUS at 19:06

## 2022-04-13 NOTE — ED NOTES
Department of Emergency Medicine  FIRST PROVIDER TRIAGE NOTE             Independent MLP           4/13/22  6:04 PM EDT    Date of Encounter: 4/13/22   MRN: [id number]      HPI: Faustina Nicole is a 59 y.o. male who presents to the ED for Emesis (started chemo on Friday. Started throwing up on Tuesday. )      ROS: Negative for cp, sob or abd pain. PE: Gen Appearance/Constitutional: alert  CV: tachycardia  Pulm: CTA bilat     Initial Plan of Care: All treatment areas with department are currently occupied. Plan to order/Initiate the following while awaiting opening in ED: labs, EKG and imaging studies.   Initiate Treatment-Testing, Proceed toTreatment Area When Bed Available for ED Attending/FAWAD to Continue Care    Electronically signed by KAT Arnold CNP   DD: 4/13/22         KAT Arnold CNP  04/13/22 3917

## 2022-04-13 NOTE — ED PROVIDER NOTES
HPI:  4/13/22, Time: 6:48 PM EDT         Quita Hoffman is a 59 y.o. male presenting to the ED for nausea, vomiting, diarrhea, dehydration, beginning 1 day ago. The complaint has been persistent, mild in severity, and worsened by nothing. Patient has a history of lung cancer. He did started chemotherapy on Friday. He states that his symptoms started after the chemotherapy. He is supposed to go through total of 4 rounds over the next 12 weeks. He is following up at the [facility]. Patient denies any fever, chills, chest pain, shortness of breath, abdominal pain. He states that he is currently experiencing no nausea, vomiting. He states that he has been having multiple stools. He states that they are not liquidy in nature. He has been eating and drinking. ROS:   Pertinent positives and negatives are stated within HPI, all other systems reviewed and are negative.  --------------------------------------------- PAST HISTORY ---------------------------------------------  Past Medical History:  has a past medical history of Cancer ([facility] [address] Diabetes mellitus ([facility] and Hyperlipidemia. Past Surgical History:  has a past surgical history that includes Lung removal, partial; Tonsillectomy; and Cholecystectomy. Social History:  reports that he quit smoking about 2 years ago. His smoking use included cigarettes. He started smoking about 50 years ago. He has a 47.00 pack-year smoking history. He has never used smokeless tobacco. He reports previous alcohol use. He reports previous drug use. Family History: family history is not on file. The patients home medications have been reviewed.     Allergies: Penicillins    ---------------------------------------------------PHYSICAL EXAM--------------------------------------    Constitutional/General: Alert and oriented x3, well appearing, non toxic in NAD  Head: Normocephalic and atraumatic  Eyes: PERRL, EOMI  Mouth: Oropharynx clear, handling secretions, no trismus  Neck: Supple, full ROM, non tender to palpation in the midline, no stridor, no crepitus, no meningeal signs  Pulmonary: Lungs clear to auscultation bilaterally, no wheezes, rales, or rhonchi. Not in respiratory distress  Cardiovascular:  Regular rate. Regular rhythm. No murmurs, gallops, or rubs. 2+ distal pulses  Chest: no chest wall tenderness  Abdomen: Soft. Non tender. Non distended. +BS. No rebound, guarding, or rigidity. No pulsatile masses appreciated. Musculoskeletal: Moves all extremities x 4. Warm and well perfused, no clubbing, cyanosis, or edema. Capillary refill <3 seconds  Skin: warm and dry. No rashes. Neurologic: GCS 15, CN 2-12 grossly intact, no focal deficits, symmetric strength 5/5 in the upper and lower extremities bilaterally  Psych: Normal Affect    -------------------------------------------------- RESULTS -------------------------------------------------  I have personally reviewed all laboratory and imaging results for this patient. Results are listed below.      LABS:  Results for orders placed or performed during the hospital encounter of 04/13/22   Comprehensive Metabolic Panel   Result Value Ref Range    Sodium 131 (L) 132 - 146 mmol/L    Potassium 4.7 3.5 - 5.0 mmol/L    Chloride 95 (L) 98 - 107 mmol/L    CO2 20 (L) 22 - 29 mmol/L    Anion Gap 16 7 - 16 mmol/L    Glucose 206 (H) 74 - 99 mg/dL    BUN 32 (H) 6 - 23 mg/dL    CREATININE 1.1 0.7 - 1.2 mg/dL    GFR Non-African American >60 >=60 mL/min/1.73    GFR African American >60     Calcium 9.7 8.6 - 10.2 mg/dL    Total Protein 7.5 6.4 - 8.3 g/dL    Albumin 4.4 3.5 - 5.2 g/dL    Total Bilirubin 0.8 0.0 - 1.2 mg/dL    Alkaline Phosphatase 75 40 - 129 U/L    ALT 47 (H) 0 - 40 U/L    AST 29 0 - 39 U/L   Magnesium   Result Value Ref Range    Magnesium 1.9 1.6 - 2.6 mg/dL   Lactate, Sepsis   Result Value Ref Range    Lactic Acid, Sepsis 1.2 0.5 - 1.9 mmol/L   Lipase   Result Value Ref Range    Lipase 50 13 - 60 U/L   CBC with Auto Differential   Result Value Ref Range    WBC 11.6 (H) 4.5 - 11.5 E9/L    RBC 5.26 3.80 - 5.80 E12/L    Hemoglobin 15.1 12.5 - 16.5 g/dL    Hematocrit 42.6 37.0 - 54.0 %    MCV 81.0 80.0 - 99.9 fL    MCH 28.7 26.0 - 35.0 pg    MCHC 35.4 (H) 32.0 - 34.5 %    RDW 13.2 11.5 - 15.0 fL    Platelets 673 493 - 306 E9/L    MPV 10.3 7.0 - 12.0 fL    Neutrophils % 82.6 (H) 43.0 - 80.0 %    Immature Granulocytes % 0.5 0.0 - 5.0 %    Lymphocytes % 12.3 (L) 20.0 - 42.0 %    Monocytes % 4.5 2.0 - 12.0 %    Eosinophils % 0.0 0.0 - 6.0 %    Basophils % 0.1 0.0 - 2.0 %    Neutrophils Absolute 9.56 (H) 1.80 - 7.30 E9/L    Immature Granulocytes # 0.06 E9/L    Lymphocytes Absolute 1.43 (L) 1.50 - 4.00 E9/L    Monocytes Absolute 0.52 0.10 - 0.95 E9/L    Eosinophils Absolute 0.00 (L) 0.05 - 0.50 E9/L    Basophils Absolute 0.01 0.00 - 0.20 E9/L   Protime-INR   Result Value Ref Range    Protime 10.8 9.3 - 12.4 sec    INR 1.0    APTT   Result Value Ref Range    aPTT 27.9 24.5 - 35.1 sec   EKG 12 Lead   Result Value Ref Range    Ventricular Rate 102 BPM    Atrial Rate 102 BPM    P-R Interval 136 ms    QRS Duration 98 ms    Q-T Interval 340 ms    QTc Calculation (Bazett) 443 ms    P Axis 90 degrees    R Axis 116 degrees    T Axis 25 degrees       RADIOLOGY:  Interpreted by Radiologist.  XR CHEST (2 VW)   Final Result      Left hilar mass and opacity in the left suprahilar region. Recommend chest   CT for better evaluation. EKG Interpretation  Interpreted by emergency department physician    Rhythm: sinus tachycardia  Rate: tachycardia  Axis: normal  Conduction: normal  ST Segments: depression in  III  T Waves: inversion in  III    Clinical Impression: non-specific EKG  Comparison to prior EKG: None      ------------------------- NURSING NOTES AND VITALS REVIEWED ---------------------------   The nursing notes within the ED encounter and vital signs as below have been reviewed by myself.   BP (!) 102/57   Pulse 130   Temp 97.9 °F (36.6 °C) (Temporal)   Resp 16   Ht 5' 8\" (1.727 m)   Wt 169 lb (76.7 kg)   SpO2 97%   BMI 25.70 kg/m²   Oxygen Saturation Interpretation: Normal    The patients available past medical records and past encounters were reviewed. ------------------------------ ED COURSE/MEDICAL DECISION MAKING----------------------  Medications   ondansetron (ZOFRAN-ODT) disintegrating tablet 4 mg (4 mg Oral Given 4/13/22 1811)   0.9 % sodium chloride bolus (1,000 mLs IntraVENous New Bag 4/13/22 1906)             Medical Decision Making:    Vital signs are stable. Labs and imaging obtained. White blood cell count 11.6. CMP is unremarkable. Chest x-ray shows a left hilar mass and opacity in the left suprahilar region. Patient is following up with oncology for left-sided lung cancer. He did not think that he had any cancer on his previous images. He states that he just had a CT of his chest done a month ago. He is planning on following up with his oncologist in 2 days. He declines getting a CT of the chest at this time. He states that he will just follow-up with his oncologist this week to discuss plan of care. He was given images to take home. I instructed the patient to continue taking his medication prescribed as directed, to follow-up with his primary care physician and oncologist this week, and to return for worsening symptoms. He is agreeable with plan of care. Re-Evaluations:             Re-evaluation. Patients symptoms are improving          This patient's ED course included: a personal history and physicial examination, re-evaluation prior to disposition, multiple bedside re-evaluations, IV medications, cardiac monitoring, continuous pulse oximetry and a personal history and physicial eaxmination    This patient has remained hemodynamically stable during their ED course. Counseling:    The emergency provider has spoken with the patient and discussed todays results, in addition to providing specific details for the plan of care and counseling regarding the diagnosis and prognosis. Questions are answered at this time and they are agreeable with the plan.       --------------------------------- IMPRESSION AND DISPOSITION ---------------------------------    IMPRESSION  1. Nausea vomiting and diarrhea    2. Mass of left lung        DISPOSITION  Disposition: Discharge to home  Patient condition is stable        NOTE: This report was transcribed using voice recognition software.  Every effort was made to ensure accuracy; however, inadvertent computerized transcription errors may be present          Brittany Gilmore MD  04/13/22 2047

## 2022-04-14 LAB
EKG ATRIAL RATE: 102 BPM
EKG P AXIS: 90 DEGREES
EKG P-R INTERVAL: 136 MS
EKG Q-T INTERVAL: 340 MS
EKG QRS DURATION: 98 MS
EKG QTC CALCULATION (BAZETT): 443 MS
EKG R AXIS: 116 DEGREES
EKG T AXIS: 25 DEGREES
EKG VENTRICULAR RATE: 102 BPM

## 2022-04-14 PROCEDURE — 93010 ELECTROCARDIOGRAM REPORT: CPT | Performed by: INTERNAL MEDICINE

## 2022-04-14 NOTE — ED NOTES
Discharge discussed w/ Patient and family. Follow up reviewed at this time with no questions.        Alea Guevara RN  04/13/22 [id number]

## 2022-05-06 ENCOUNTER — TELEPHONE (OUTPATIENT)
Dept: VASCULAR SURGERY | Age: 62
End: 2022-05-06

## 2022-05-06 NOTE — TELEPHONE ENCOUNTER
Spoke to pt on his aaa testing that needs done prior to appt on 7-5-2022 with Dr Kim Nails. Pt will set up testing at a [id number] [place] [address] that has weekend testing.

## 2022-07-01 ENCOUNTER — TELEPHONE (OUTPATIENT)
Dept: VASCULAR SURGERY | Age: 62
End: 2022-07-01

## 2022-07-02 ENCOUNTER — HOSPITAL ENCOUNTER (OUTPATIENT)
Age: 62
Discharge: HOME | End: 2022-07-02
Payer: COMMERCIAL

## 2022-07-02 ENCOUNTER — HOSPITAL ENCOUNTER (OUTPATIENT)
Dept: CT IMAGING | Age: 62
Discharge: HOME | End: 2022-07-02
Payer: COMMERCIAL

## 2022-07-02 ENCOUNTER — HOSPITAL ENCOUNTER (OUTPATIENT)
Dept: ULTRASOUND IMAGING | Age: 62
Discharge: HOME | End: 2022-07-02
Payer: COMMERCIAL

## 2022-07-02 DIAGNOSIS — C34.12 MALIGNANT NEOPLASM OF UPPER LOBE, LEFT BRONCHUS OR LUNG (HCC): ICD-10-CM

## 2022-07-02 DIAGNOSIS — G62.0 DRUG-INDUCED POLYNEUROPATHY (HCC): ICD-10-CM

## 2022-07-02 DIAGNOSIS — I71.40 AAA (ABDOMINAL AORTIC ANEURYSM) WITHOUT RUPTURE: ICD-10-CM

## 2022-07-02 LAB
CHOLESTEROL, TOTAL: 185 MG/DL (ref 0–199)
CREAT SERPL-MCNC: 1 MG/DL (ref 0.7–1.2)
CREATININE URINE: 112 MG/DL (ref 40–278)
GFR AFRICAN AMERICAN: >60
GFR NON-AFRICAN AMERICAN: >60 ML/MIN/1.73
HBA1C MFR BLD: 7.2 % (ref 4–5.6)
HDLC SERPL-MCNC: 45 MG/DL
LDL CHOLESTEROL CALCULATED: 99 MG/DL (ref 0–99)
MICROALBUMIN UR-MCNC: <12 MG/L
MICROALBUMIN/CREAT UR-RTO: ABNORMAL (ref 0–30)
PROSTATE SPECIFIC ANTIGEN: 8 NG/ML (ref 0–4)
TRIGL SERPL-MCNC: 205 MG/DL (ref 0–149)
VLDLC SERPL CALC-MCNC: 41 MG/DL

## 2022-07-02 PROCEDURE — 36415 COLL VENOUS BLD VENIPUNCTURE: CPT

## 2022-07-02 PROCEDURE — 74177 CT ABD & PELVIS W/CONTRAST: CPT

## 2022-07-02 PROCEDURE — 76775 US EXAM ABDO BACK WALL LIM: CPT

## 2022-07-02 PROCEDURE — 6360000004 HC RX CONTRAST MEDICATION: Performed by: RADIOLOGY

## 2022-07-02 PROCEDURE — G0103 PSA SCREENING: HCPCS

## 2022-07-02 PROCEDURE — 82570 ASSAY OF URINE CREATININE: CPT

## 2022-07-02 PROCEDURE — 82565 ASSAY OF CREATININE: CPT

## 2022-07-02 PROCEDURE — 71260 CT THORAX DX C+: CPT

## 2022-07-02 PROCEDURE — 83036 HEMOGLOBIN GLYCOSYLATED A1C: CPT

## 2022-07-02 PROCEDURE — 82044 UR ALBUMIN SEMIQUANTITATIVE: CPT

## 2022-07-02 PROCEDURE — 80061 LIPID PANEL: CPT

## 2022-07-02 RX ADMIN — IOPAMIDOL 75 ML: 755 INJECTION, SOLUTION INTRAVENOUS at 09:48

## 2022-07-05 ENCOUNTER — OFFICE VISIT (OUTPATIENT)
Dept: VASCULAR SURGERY | Age: 62
End: 2022-07-05
Payer: COMMERCIAL

## 2022-07-05 DIAGNOSIS — I71.40 AAA (ABDOMINAL AORTIC ANEURYSM) WITHOUT RUPTURE: Primary | ICD-10-CM

## 2022-07-05 PROCEDURE — 99214 OFFICE O/P EST MOD 30 MIN: CPT | Performed by: SURGERY

## 2022-07-05 RX ORDER — M-VIT,TX,IRON,MINS/CALC/FOLIC 27MG-0.4MG
1 TABLET ORAL DAILY
COMMUNITY

## 2022-07-05 NOTE — PROGRESS NOTES
Vascular Surgery Outpatient Progress Note      Chief Complaint   Patient presents with    Circulatory Problem     Follow up AAA       HISTORY OF PRESENT ILLNESS:                The patient is a 59 y.o. male who returns for follow-up evaluation of his abdominal aortic aneurysm. He denies any new problems since I saw him last.  He continues his follow-up for his cancer screening. A CAT scan was ordered to evaluate for disease that did report the aneurysm measuring 5.6 cm. Past Medical History:        Diagnosis Date    Cancer ([facility] [address]     lung    Diabetes mellitus ([facility] [address]     Hyperlipidemia      Past Surgical History:        Procedure Laterality Date    CHOLECYSTECTOMY      LUNG REMOVAL, PARTIAL      TONSILLECTOMY       Current Medications:   Prior to Admission medications    Medication Sig Start Date End Date Taking?  Authorizing Provider   Cyanocobalamin (VITAMIN B 12 PO) Take by mouth   Yes Historical Provider, MD   Multiple Vitamins-Minerals (THERAPEUTIC MULTIVITAMIN-MINERALS) tablet Take 1 tablet by mouth daily   Yes Historical Provider, MD   FOLIC ACID PO Take by mouth   Yes Historical Provider, MD   simvastatin (ZOCOR) 5 MG tablet Take 5 mg by mouth nightly   Yes Historical Provider, MD   metFORMIN (GLUCOPHAGE) 500 MG tablet Take 500 mg by mouth 2 times daily 12/16/21  Yes Historical Provider, MD     Allergies:  Penicillins    Social History     Socioeconomic History    Marital status:      Spouse name: Not on file    Number of children: Not on file    Years of education: Not on file    Highest education level: Not on file   Occupational History    Not on file   Tobacco Use    Smoking status: Former Smoker     Packs/day: 1.00     Years: 47.00     Pack years: 47.00     Types: Cigarettes     Start date: 0     Quit date: 06/2019     Years since quitting: 3.0    Smokeless tobacco: Never Used    Tobacco comment: Per CT Lung Screening order   Vaping Use    Vaping Use: Never used   Substance and Sexual Activity    Alcohol use: Not Currently    Drug use: Not Currently    Sexual activity: Not on file   Other Topics Concern    Not on file   Social History Narrative    Not on file     Social Determinants of Health     Financial Resource Strain:     Difficulty of Paying Living Expenses: Not on file   Food Insecurity:     Worried About Running Out of Food in the Last Year: Not on file    Jair of Food in the Last Year: Not on file   Transportation Needs:     Lack of Transportation (Medical): Not on file    Lack of Transportation (Non-Medical): Not on file   Physical Activity:     Days of Exercise per Week: Not on file    Minutes of Exercise per Session: Not on file   Stress:     Feeling of Stress : Not on file   Social Connections:     Frequency of Communication with Friends and Family: Not on file    Frequency of Social Gatherings with Friends and Family: Not on file    Attends Roman [employer] Services: Not on file    Active Member of [address] [place] [address] or Organizations: Not on file    Attends Club or Organization Meetings: Not on file    Marital Status: Not on file   Intimate Partner Violence:     Fear of Current or Ex-Partner: Not on file    Emotionally Abused: Not on file    Physically Abused: Not on file    Sexually Abused: Not on file   Housing Stability:     Unable to Pay for Housing in the Last Year: Not on file    Number of [place] in the Last Year: Not on file    Unstable Housing in the Last Year: Not on file        History reviewed. No pertinent family history.     REVIEW OF SYSTEMS (New symptoms):    Eyes:      Blurred vision:  No [x]/Yes []               Diplopia:   No [x]/Yes []               Vision loss:       No [x]/Yes []   Ears, nose, throat:             Hearing loss:    No [x]/Yes []      Vertigo:   No [x]/Yes []                       Swallowing problem:  No [x]/Yes []               Nose bleeds:   No [x]/Yes []      Voice hoarseness:  No [x]/Yes []  Respiratory: Cough: No [x]/Yes []      Pleuritic chest pain:  No [x]/Yes []                        Dyspnea:   No [x]/Yes []      Wheezing:   No [x]/Yes []  Cardiovascular:             Angina:   No [x]/Yes []      Palpitations:   No [x]/Yes []          Claudication:    No [x]/Yes []      Leg swelling:   No [x]/Yes []  Gastrointestinal:             Nausea or vomiting:  No [x]/Yes []               Abdominal pain:  No [x]/Yes []                     Intestinal bleeding: No [x]/Yes []  Musculoskeletal:             Leg pain:   No [x]/Yes []      Back pain:   No [x]/Yes []                    Weakness:   No [x]/Yes []  Neurologic:             Numbness:   No [x]/Yes []      Paralysis:   No [x]/Yes []                       Headaches:   No [x]/Yes []  Hematologic, lymphatic:   Anemia:   No [x]/Yes []              Bleeding or bruising:  No [x]/Yes []              Fevers or chills: No [x]/Yes []  Endocrine:             Temp intolerance:   No [x]/Yes []                       Polydipsia, polyuria:  No [x]/Yes []  Skin:              Rash:    No [x]/Yes []      Ulcers:   No [x]/Yes []              Abnorm pigment: No [x]/Yes []  :              Frequency/urgency:  No [x]/Yes []      Hematuria:    No [x]/Yes []                      Incontinence:    No [x]/Yes []    PHYSICAL EXAM:  There were no vitals filed for this visit.   General Appearance: alert and oriented to person, place and time, in no acute distress, well developed and well- nourished  Neurologic: no cranial nerve deficit, speech normal  Head: normocephalic and atraumatic  Eyes: extraocular eye movements intact, conjunctivae normal  ENT: external ear and ear canal normal bilaterally, nose without deformity, no carotid bruits  Pulmonary/Chest: normal air movement, no respiratory distress  Cardiovascular: normal rate, regular rhythm, no murmur  Abdomen: non-distended, no masses  Musculoskeletal: no joint deformity or tenderness  Extremities: no leg edema bilaterally  Skin: warm and dry, no rash or erythema    PULSE EXAM      Right      Left   Brachial     Radial     Femoral     Popliteal     Dorsalis Pedis     Posterior Tibial     (3=normal, 2=diminished, 1=barely palpable, 4=widened)    RADIOLOGY: [facility] today    Problem List Items Addressed This Visit     AAA (abdominal aortic aneurysm) without rupture ([address] - Primary          I reviewed the CAT scan images and the aneurysm does measure greater than 5 cm. It has enlarged and now meets criteria for elective repair. I reviewed this with the patient. He does appear to be a candidate for endovascular repair. He did have a cardiac stress test at [facility] prior to his lung surgery in February of this year. It showed no reversible ischemia. The patient would like to wait until the fall to have the surgery. I reviewed with him that I feel that the rupture risks are low but certainly not 0 and he understands and accepts the risk. I will plan to see him back in September for reevaluation. I reviewed with him that in the unlikely event he will develop new severe abdominal or back pain to present immediately to the emergency department. Return in about 2 months (around 9/5/2022).

## 2022-07-05 NOTE — PATIENT INSTRUCTIONS
Patient Education        Endovascular Aortic Aneurysm Repair: Before Your Procedure  What is endovascular aortic aneurysm repair? Endovascular aortic aneurysm repair fixes an aneurysm in your aorta. An aneurysm is a weak or bulging part of a vein or artery. Your aorta is a large artery. It carries blood from your heart through your belly to the rest of yourbody. If you don't fix this problem, your aorta could burst. This can cause death. The procedure is called endovascular because a doctor repairs the aneurysm from the inside of the damaged blood vessel (the aorta). It's not a surgery. Localor general anesthesia might be used. Your doctor may make small cuts in the groin area. Thin tubes, called catheters, are inserted through the cuts and into blood vessels. The doctor guides the catheters into the aorta. A man-made tube is placed in the aneurysm. This tube is called a stent graft. After the procedure, your blood will flow through the stent graft. It will notpush on the aneurysm. You may spend 1 to 3 days in the hospital. You may be able to return to workand many of your daily activities 1 to 2 weeks after the procedure. Follow-up care is a key part of your treatment and safety. Be sure to make and go to all appointments, and call your doctor if you are having problems. It's also a good idea to know your test results and keep alist of the medicines you take. How do you prepare for the procedure? Procedures can be stressful. This information will help you understand what youcan expect. And it will help you safely prepare for your procedure. Preparing for the procedure     Be sure you have someone to take you home. Anesthesia and pain medicine will make it unsafe for you to drive or get home on your own.      Understand exactly what procedure is planned, along with the risks, benefits, and other options.      Tell your doctor ALL the medicines, vitamins, supplements, and herbal remedies you take. Some may increase the risk of problems during your procedure. Your doctor will tell you if you should stop taking any of them before the procedure and how soon to do it.      If you take aspirin or some other blood thinner, ask your doctor if you should stop taking it before your procedure. Make sure that you understand exactly what your doctor wants you to do. These medicines increase the risk of bleeding.      Make sure your doctor and the hospital have a copy of your advance directive. If you don't have one, you may want to prepare one. It lets others know your health care wishes. It's a good thing to have before any type of surgery or procedure. What happens on the day of the procedure?     Follow the instructions exactly about when to stop eating and drinking. If you don't, your procedure may be canceled. If your doctor told you to take your medicines on the day of the procedure, take them with only a sip of water.      Take a bath or shower before you come in for your procedure. Do not apply lotions, perfumes, deodorants, or nail polish.      Take off all jewelry and piercings. And take out contact lenses, if you wear them. At the hospital or surgery center     Bring a picture ID.      You will be kept comfortable and safe by your anesthesia provider. You may be asleep during the procedure. Or you may get medicine that relaxes you or puts you in a light sleep. The area being worked on will be numb.      The procedure will take about 1 to 4 hours. When should you call your doctor?  You have questions or concerns.      You don't understand how to prepare for your procedure.      You become ill before the procedure (such as fever, flu, or a cold).      You need to reschedule or have changed your mind about having the procedure. Where can you learn more? Go to [email or web address] and sign in to your [employer] account.  Enter [phone] in the Live Calendars box to learn more about \"Endovascular Aortic Aneurysm Repair: Before Your Procedure. \"     If you do not have an account, please click on the \"Sign Up Now\" link. Current as of: March 28, 2022               Content Version: 13.3  © 6399-6409 [employer], Incorporated. Care instructions adapted under license by [facility] ([facility]). If you have questions about a medical condition or this instruction, always ask your healthcare professional. [address] any warranty or liability for your use of this information.

## 2022-09-12 ENCOUNTER — TELEPHONE (OUTPATIENT)
Dept: VASCULAR SURGERY | Age: 62
End: 2022-09-12

## 2022-09-13 ENCOUNTER — OFFICE VISIT (OUTPATIENT)
Dept: VASCULAR SURGERY | Age: 62
End: 2022-09-13
Payer: COMMERCIAL

## 2022-09-13 DIAGNOSIS — I71.40 AAA (ABDOMINAL AORTIC ANEURYSM) WITHOUT RUPTURE: Primary | ICD-10-CM

## 2022-09-13 PROCEDURE — 99214 OFFICE O/P EST MOD 30 MIN: CPT | Performed by: SURGERY

## 2022-09-13 RX ORDER — ROSUVASTATIN CALCIUM 10 MG/1
10 TABLET, COATED ORAL DAILY
COMMUNITY
Start: 2022-06-06

## 2022-09-13 RX ORDER — DULOXETIN HYDROCHLORIDE 30 MG/1
30 CAPSULE, DELAYED RELEASE ORAL DAILY
COMMUNITY
Start: 2022-09-10

## 2022-09-13 NOTE — PROGRESS NOTES
Vascular Surgery Outpatient Progress Note      Chief Complaint   Patient presents with    Circulatory Problem     Discuss scheduling  EVAR. HISTORY OF PRESENT ILLNESS:                The patient is a 59 y.o. male who returns for follow-up evaluation of of his abdominal aortic aneurysm. He denies any problems since I saw him in July. Past Medical History:        Diagnosis Date    Cancer ([facility] [address]     lung    Diabetes mellitus ([facility]     Hyperlipidemia      Past Surgical History:        Procedure Laterality Date    CHOLECYSTECTOMY      LUNG REMOVAL, PARTIAL      TONSILLECTOMY       Current Medications:   Prior to Admission medications    Medication Sig Start Date End Date Taking?  Authorizing Provider   Cyanocobalamin (VITAMIN B 12 PO) Take by mouth   Yes Historical Provider, MD   Multiple Vitamins-Minerals (THERAPEUTIC MULTIVITAMIN-MINERALS) tablet Take 1 tablet by mouth daily   Yes Historical Provider, MD   FOLIC ACID PO Take by mouth   Yes Historical Provider, MD   simvastatin (ZOCOR) 5 MG tablet Take 5 mg by mouth nightly   Yes Historical Provider, MD   metFORMIN (GLUCOPHAGE) 500 MG tablet Take 500 mg by mouth 2 times daily 12/16/21  Yes Historical Provider, MD     Allergies:  Penicillins    Social History     Socioeconomic History    Marital status:      Spouse name: Not on file    Number of children: Not on file    Years of education: Not on file    Highest education level: Not on file   Occupational History    Not on file   Tobacco Use    Smoking status: Former     Packs/day: 1.00     Years: 47.00     Pack years: 47.00     Types: Cigarettes     Start date: 0     Quit date: 06/2019     Years since quitting: 3.2    Smokeless tobacco: Never    Tobacco comments:     Per CT Lung Screening order   Vaping Use    Vaping Use: Never used   Substance and Sexual Activity    Alcohol use: Not Currently    Drug use: Not Currently    Sexual activity: Not on file   Other Topics Concern    Not on file   Social Frequency/urgency:  No [x]/Yes []      Hematuria:    No [x]/Yes []                      Incontinence:    No [x]/Yes []    PHYSICAL EXAM:  There were no vitals filed for this visit. General Appearance: alert and oriented to person, place and time, in no acute distress, well developed and well- nourished  Neurologic: no cranial nerve deficit, speech normal  Head: normocephalic and atraumatic  Eyes: extraocular eye movements intact, conjunctivae normal  ENT: external ear and ear canal normal bilaterally, nose without deformity, no carotid bruits  Pulmonary/Chest: normal air movement, no respiratory distress  Cardiovascular: normal rate, regular rhythm, no murmur  Abdomen: non-distended, no masses  Musculoskeletal: no joint deformity or tenderness  Extremities: no leg edema bilaterally  Skin: warm and dry, no rash or erythema    PULSE EXAM      Right      Left   Brachial     Radial 3 3   Femoral     Popliteal     Dorsalis Pedis     Posterior Tibial 2 2   (3=normal, 2=diminished, 1=barely palpable, 4=widened)    RADIOLOGY: [facility] today    Problem List Items Addressed This Visit       AAA (abdominal aortic aneurysm) without rupture ([facility]) - Primary       I reviewed the plan for endovascular aneurysm repair with the patient. I reviewed the procedure with the patient. I discussed the risks, benefits, and alternatives of the procedure. The patient understands and consents. All questions were answered. No follow-ups on file.

## 2022-10-05 NOTE — PROGRESS NOTES
[address]   PRE-ADMISSION TESTING GENERAL INSTRUCTIONS  [facility] Phone Number: [phone]      GENERAL INSTRUCTIONS:    [x] Antibacterial Soap Shower Night before and/or AM of surgery. [x] Do not wear makeup, lotions, powders, deodorant. [x] Nothing by mouth after midnight; including gum, candy, mints, or water. [x] You may brush your teeth, gargle, but do NOT swallow water. [x] No tobacco products, illegal drugs, or alcohol within 24 hours of your surgery. [x] Jewelry or valuables should not be brought to the hospital. All body and/or tongue piercing's must be removed prior to arriving to hospital. No contact lens or hair pins. [x] Bring insurance card and photo ID. [x] Transfusion Bracelet: Please bring with you to hospital, day of surgery. PARKING INSTRUCTIONS:     [x] ARRIVAL DATE & TIME: Oct 21st at 5:30 am  [x] Enter into the [employer]. Two people may accompany you. Masks are not required but are recommended. [x] Parking Lot \"I\" is where you will park. It is located on the corner of [facility] [address] and [facility]. The entrance is on [facility]. Upon entering the parking lot, a voucher ticket will print    EDUCATION INSTRUCTIONS:      [x] Pre-admission Testing educational folder given  [x] Incentive Spirometry,coughing & deep breathing exercises reviewed. [x] Medication information sheet(s)   [x] Fluoroscopy-Xray used in surgery reviewed with patient. Educational pamphlet placed in chart. [x] Pain: Post-op pain is normal and to be expected. You will be asked to rate your pain from 0-10. MEDICATION INSTRUCTIONS:    [x] Bring a complete list of your medications, please write the last time you took the medicine, give this list to the nurse in Pre-Op. [x] Take only the following medications the morning of surgery with 1-2 ounces of water: cymbalta   [x] Stop all herbal supplements and vitamins 5 days before surgery.  Stop NSAIDS 7 days before surgery. [x] DO NOT take any diabetic medicine the morning of surgery. Follow instructions for insulin the day before surgery. [x] If you are diabetic and your blood sugar is low or you feel symptomatic, you may drink 1-2 ounces of apple juice or take a glucose tablet.            -The morning of your procedure, you may call the pre-op area if you have concerns about your blood sugar [phone]. [x] Follow physician instructions regarding any blood thinners you may be taking. WHAT TO EXPECT:    [x] The day of surgery you will be greeted and checked in by the [employer].  In addition, you will be registered in the [facility] by a Patient Access Representative. Please bring your photo ID and insurance card. A nurse will greet you in accordance to the time you are needed in the pre-op area to prepare you for surgery. Please do not be discouraged if you are not greeted in the order you arrive as there are many variables that are involved in patient preparation. Your patience is greatly appreciated as you wait for your nurse. Please bring in items such as: books, magazines, newspapers, electronics, or any other items  to occupy your time in the waiting area. [x]  Delays may occur with surgery and staff will make a sincere effort to keep you informed of delays. If any delays occur with your procedure, we apologize ahead of time for your inconvenience as we recognize the value of your time.

## 2022-10-14 ENCOUNTER — HOSPITAL ENCOUNTER (OUTPATIENT)
Dept: GENERAL RADIOLOGY | Age: 62
Discharge: HOME | End: 2022-10-16
Payer: COMMERCIAL

## 2022-10-14 ENCOUNTER — HOSPITAL ENCOUNTER (OUTPATIENT)
Dept: PREADMISSION TESTING | Age: 62
Discharge: HOME | End: 2022-10-14
Payer: COMMERCIAL

## 2022-10-14 VITALS
DIASTOLIC BLOOD PRESSURE: 60 MMHG | OXYGEN SATURATION: 96 % | RESPIRATION RATE: 18 BRPM | WEIGHT: 163 LBS | BODY MASS INDEX: 24.71 KG/M2 | SYSTOLIC BLOOD PRESSURE: 115 MMHG | HEART RATE: 95 BPM | TEMPERATURE: 97.7 F | HEIGHT: 68 IN

## 2022-10-14 DIAGNOSIS — I71.43 INFRARENAL ABDOMINAL AORTIC ANEURYSM (AAA) WITHOUT RUPTURE: ICD-10-CM

## 2022-10-14 DIAGNOSIS — Z01.812 PRE-OPERATIVE LABORATORY EXAMINATION: Primary | ICD-10-CM

## 2022-10-14 LAB
ABO/RH: NORMAL
ANION GAP SERPL CALCULATED.3IONS-SCNC: 12 MMOL/L (ref 7–16)
ANTIBODY SCREEN: NORMAL
BUN BLDV-MCNC: 25 MG/DL (ref 6–23)
CALCIUM SERPL-MCNC: 9.7 MG/DL (ref 8.6–10.2)
CHLORIDE BLD-SCNC: 102 MMOL/L (ref 98–107)
CO2: 25 MMOL/L (ref 22–29)
CREAT SERPL-MCNC: 1 MG/DL (ref 0.7–1.2)
EKG ATRIAL RATE: 86 BPM
EKG P AXIS: 81 DEGREES
EKG P-R INTERVAL: 154 MS
EKG Q-T INTERVAL: 374 MS
EKG QRS DURATION: 104 MS
EKG QTC CALCULATION (BAZETT): 447 MS
EKG R AXIS: 101 DEGREES
EKG T AXIS: 44 DEGREES
EKG VENTRICULAR RATE: 86 BPM
GFR AFRICAN AMERICAN: >60
GFR NON-AFRICAN AMERICAN: >60 ML/MIN/1.73
GLUCOSE BLD-MCNC: 158 MG/DL (ref 74–99)
HCT VFR BLD CALC: 37.1 % (ref 37–54)
HEMOGLOBIN: 12.9 G/DL (ref 12.5–16.5)
INR BLD: 1.1
MCH RBC QN AUTO: 30 PG (ref 26–35)
MCHC RBC AUTO-ENTMCNC: 34.8 % (ref 32–34.5)
MCV RBC AUTO: 86.3 FL (ref 80–99.9)
PDW BLD-RTO: 12.7 FL (ref 11.5–15)
PLATELET # BLD: 268 E9/L (ref 130–450)
PMV BLD AUTO: 9.9 FL (ref 7–12)
POTASSIUM REFLEX MAGNESIUM: 4.4 MMOL/L (ref 3.5–5)
PROTHROMBIN TIME: 12.4 SEC (ref 9.3–12.4)
RBC # BLD: 4.3 E12/L (ref 3.8–5.8)
SODIUM BLD-SCNC: 139 MMOL/L (ref 132–146)
WBC # BLD: 5.8 E9/L (ref 4.5–11.5)

## 2022-10-14 PROCEDURE — 87081 CULTURE SCREEN ONLY: CPT

## 2022-10-14 PROCEDURE — 86900 BLOOD TYPING SEROLOGIC ABO: CPT

## 2022-10-14 PROCEDURE — 71046 X-RAY EXAM CHEST 2 VIEWS: CPT

## 2022-10-14 PROCEDURE — 86850 RBC ANTIBODY SCREEN: CPT

## 2022-10-14 PROCEDURE — 86901 BLOOD TYPING SEROLOGIC RH(D): CPT

## 2022-10-14 PROCEDURE — 80048 BASIC METABOLIC PNL TOTAL CA: CPT

## 2022-10-14 PROCEDURE — 93005 ELECTROCARDIOGRAM TRACING: CPT | Performed by: PHYSICIAN ASSISTANT

## 2022-10-14 PROCEDURE — 85610 PROTHROMBIN TIME: CPT

## 2022-10-14 PROCEDURE — 36415 COLL VENOUS BLD VENIPUNCTURE: CPT

## 2022-10-14 PROCEDURE — 85027 COMPLETE CBC AUTOMATED: CPT

## 2022-10-15 LAB — MRSA CULTURE ONLY: NORMAL

## 2022-10-20 ENCOUNTER — ANESTHESIA EVENT (OUTPATIENT)
Dept: OPERATING ROOM | Age: 62
DRG: 269 | End: 2022-10-20
Payer: COMMERCIAL

## 2022-10-21 ENCOUNTER — HOSPITAL ENCOUNTER (INPATIENT)
Age: 62
LOS: 1 days | Discharge: HOME | DRG: 269 | End: 2022-10-22
Attending: SURGERY | Admitting: SURGERY
Payer: COMMERCIAL

## 2022-10-21 ENCOUNTER — ANESTHESIA (OUTPATIENT)
Dept: OPERATING ROOM | Age: 62
DRG: 269 | End: 2022-10-21
Payer: COMMERCIAL

## 2022-10-21 DIAGNOSIS — Z01.812 PRE-OPERATIVE LABORATORY EXAMINATION: ICD-10-CM

## 2022-10-21 DIAGNOSIS — I71.43 INFRARENAL ABDOMINAL AORTIC ANEURYSM (AAA) WITHOUT RUPTURE: Primary | ICD-10-CM

## 2022-10-21 PROBLEM — Z86.79 S/P AAA REPAIR USING BIFURCATION GRAFT: Status: ACTIVE | Noted: 2022-10-21

## 2022-10-21 PROBLEM — Z86.79 S/P AAA REPAIR: Status: ACTIVE | Noted: 2022-10-21

## 2022-10-21 PROBLEM — Z98.890 S/P AAA REPAIR: Status: ACTIVE | Noted: 2022-10-21

## 2022-10-21 PROBLEM — Z95.828 S/P AAA REPAIR USING BIFURCATION GRAFT: Status: ACTIVE | Noted: 2022-10-21

## 2022-10-21 LAB — METER GLUCOSE: 177 MG/DL (ref 74–99)

## 2022-10-21 PROCEDURE — 6360000002 HC RX W HCPCS

## 2022-10-21 PROCEDURE — 2500000003 HC RX 250 WO HCPCS

## 2022-10-21 PROCEDURE — 2720000010 HC SURG SUPPLY STERILE: Performed by: SURGERY

## 2022-10-21 PROCEDURE — 3600000007 HC SURGERY HYBRID BASE: Performed by: SURGERY

## 2022-10-21 PROCEDURE — 2580000003 HC RX 258: Performed by: PHYSICIAN ASSISTANT

## 2022-10-21 PROCEDURE — 85347 COAGULATION TIME ACTIVATED: CPT

## 2022-10-21 PROCEDURE — 2580000003 HC RX 258: Performed by: NURSE PRACTITIONER

## 2022-10-21 PROCEDURE — 6360000004 HC RX CONTRAST MEDICATION: Performed by: SURGERY

## 2022-10-21 PROCEDURE — 7100000001 HC PACU RECOVERY - ADDTL 15 MIN: Performed by: SURGERY

## 2022-10-21 PROCEDURE — C1760 CLOSURE DEV, VASC: HCPCS | Performed by: SURGERY

## 2022-10-21 PROCEDURE — C1894 INTRO/SHEATH, NON-LASER: HCPCS | Performed by: SURGERY

## 2022-10-21 PROCEDURE — 3600000017 HC SURGERY HYBRID ADDL 15MIN: Performed by: SURGERY

## 2022-10-21 PROCEDURE — 7100000000 HC PACU RECOVERY - FIRST 15 MIN: Performed by: SURGERY

## 2022-10-21 PROCEDURE — 2580000003 HC RX 258: Performed by: SURGERY

## 2022-10-21 PROCEDURE — A4217 STERILE WATER/SALINE, 500 ML: HCPCS | Performed by: SURGERY

## 2022-10-21 PROCEDURE — 04V03DZ RESTRICTION OF ABDOMINAL AORTA WITH INTRALUMINAL DEVICE, PERCUTANEOUS APPROACH: ICD-10-PCS | Performed by: SURGERY

## 2022-10-21 PROCEDURE — 6360000002 HC RX W HCPCS: Performed by: PHYSICIAN ASSISTANT

## 2022-10-21 PROCEDURE — 6360000002 HC RX W HCPCS: Performed by: ANESTHESIOLOGY

## 2022-10-21 PROCEDURE — 6360000002 HC RX W HCPCS: Performed by: NURSE PRACTITIONER

## 2022-10-21 PROCEDURE — 2000000000 HC ICU R&B

## 2022-10-21 PROCEDURE — 2580000003 HC RX 258

## 2022-10-21 PROCEDURE — 2709999900 HC NON-CHARGEABLE SUPPLY: Performed by: SURGERY

## 2022-10-21 PROCEDURE — C1769 GUIDE WIRE: HCPCS | Performed by: SURGERY

## 2022-10-21 PROCEDURE — 2500000003 HC RX 250 WO HCPCS: Performed by: SURGERY

## 2022-10-21 PROCEDURE — 3700000001 HC ADD 15 MINUTES (ANESTHESIA): Performed by: SURGERY

## 2022-10-21 PROCEDURE — 6360000002 HC RX W HCPCS: Performed by: SURGERY

## 2022-10-21 PROCEDURE — 3700000000 HC ANESTHESIA ATTENDED CARE: Performed by: SURGERY

## 2022-10-21 PROCEDURE — C1768 GRAFT, VASCULAR: HCPCS | Performed by: SURGERY

## 2022-10-21 PROCEDURE — C1725 CATH, TRANSLUMIN NON-LASER: HCPCS | Performed by: SURGERY

## 2022-10-21 PROCEDURE — 82962 GLUCOSE BLOOD TEST: CPT

## 2022-10-21 PROCEDURE — C2628 CATHETER, OCCLUSION: HCPCS | Performed by: SURGERY

## 2022-10-21 DEVICE — GRAFT ENDOPROS L12CM DST DIA12MM CONTRALATERAL LEG W/ C3: Type: IMPLANTABLE DEVICE | Site: AORTA | Status: FUNCTIONAL

## 2022-10-21 DEVICE — IMPLANTABLE DEVICE: Type: IMPLANTABLE DEVICE | Site: AORTA | Status: FUNCTIONAL

## 2022-10-21 RX ORDER — ONDANSETRON 2 MG/ML
4 INJECTION INTRAMUSCULAR; INTRAVENOUS
Status: DISCONTINUED | OUTPATIENT
Start: 2022-10-21 | End: 2022-10-21 | Stop reason: HOSPADM

## 2022-10-21 RX ORDER — ACETAMINOPHEN 325 MG/1
650 TABLET ORAL EVERY 4 HOURS PRN
Status: DISCONTINUED | OUTPATIENT
Start: 2022-10-21 | End: 2022-10-22 | Stop reason: HOSPADM

## 2022-10-21 RX ORDER — MEPERIDINE HYDROCHLORIDE 25 MG/ML
12.5 INJECTION INTRAMUSCULAR; INTRAVENOUS; SUBCUTANEOUS EVERY 5 MIN PRN
Status: DISCONTINUED | OUTPATIENT
Start: 2022-10-21 | End: 2022-10-21 | Stop reason: HOSPADM

## 2022-10-21 RX ORDER — SODIUM CHLORIDE 0.9 % (FLUSH) 0.9 %
5-40 SYRINGE (ML) INJECTION EVERY 12 HOURS SCHEDULED
Status: DISCONTINUED | OUTPATIENT
Start: 2022-10-21 | End: 2022-10-21 | Stop reason: HOSPADM

## 2022-10-21 RX ORDER — FENTANYL CITRATE 50 UG/ML
INJECTION, SOLUTION INTRAMUSCULAR; INTRAVENOUS PRN
Status: DISCONTINUED | OUTPATIENT
Start: 2022-10-21 | End: 2022-10-21 | Stop reason: SDUPTHER

## 2022-10-21 RX ORDER — PHENYLEPHRINE HCL IN 0.9% NACL 1 MG/10 ML
SYRINGE (ML) INTRAVENOUS PRN
Status: DISCONTINUED | OUTPATIENT
Start: 2022-10-21 | End: 2022-10-21 | Stop reason: SDUPTHER

## 2022-10-21 RX ORDER — MORPHINE SULFATE 2 MG/ML
INJECTION, SOLUTION INTRAMUSCULAR; INTRAVENOUS
Status: COMPLETED
Start: 2022-10-21 | End: 2022-10-21

## 2022-10-21 RX ORDER — MORPHINE SULFATE 2 MG/ML
1 INJECTION, SOLUTION INTRAMUSCULAR; INTRAVENOUS EVERY 5 MIN PRN
Status: DISCONTINUED | OUTPATIENT
Start: 2022-10-21 | End: 2022-10-21 | Stop reason: HOSPADM

## 2022-10-21 RX ORDER — ONDANSETRON 2 MG/ML
INJECTION INTRAMUSCULAR; INTRAVENOUS PRN
Status: DISCONTINUED | OUTPATIENT
Start: 2022-10-21 | End: 2022-10-21 | Stop reason: SDUPTHER

## 2022-10-21 RX ORDER — DEXAMETHASONE SODIUM PHOSPHATE 10 MG/ML
INJECTION INTRAMUSCULAR; INTRAVENOUS PRN
Status: DISCONTINUED | OUTPATIENT
Start: 2022-10-21 | End: 2022-10-21 | Stop reason: SDUPTHER

## 2022-10-21 RX ORDER — HEPARIN SODIUM 1000 [USP'U]/ML
INJECTION, SOLUTION INTRAVENOUS; SUBCUTANEOUS PRN
Status: DISCONTINUED | OUTPATIENT
Start: 2022-10-21 | End: 2022-10-21 | Stop reason: SDUPTHER

## 2022-10-21 RX ORDER — SODIUM CHLORIDE 0.9 % (FLUSH) 0.9 %
5-40 SYRINGE (ML) INJECTION PRN
Status: DISCONTINUED | OUTPATIENT
Start: 2022-10-21 | End: 2022-10-21 | Stop reason: HOSPADM

## 2022-10-21 RX ORDER — MORPHINE SULFATE 2 MG/ML
2 INJECTION, SOLUTION INTRAMUSCULAR; INTRAVENOUS EVERY 5 MIN PRN
Status: COMPLETED | OUTPATIENT
Start: 2022-10-21 | End: 2022-10-21

## 2022-10-21 RX ORDER — PROTAMINE SULFATE 10 MG/ML
INJECTION, SOLUTION INTRAVENOUS PRN
Status: DISCONTINUED | OUTPATIENT
Start: 2022-10-21 | End: 2022-10-21 | Stop reason: SDUPTHER

## 2022-10-21 RX ORDER — OXYCODONE HYDROCHLORIDE 5 MG/1
5 TABLET ORAL
Status: DISCONTINUED | OUTPATIENT
Start: 2022-10-21 | End: 2022-10-22 | Stop reason: HOSPADM

## 2022-10-21 RX ORDER — SODIUM CHLORIDE 0.9 % (FLUSH) 0.9 %
5-40 SYRINGE (ML) INJECTION PRN
Status: DISCONTINUED | OUTPATIENT
Start: 2022-10-21 | End: 2022-10-22 | Stop reason: HOSPADM

## 2022-10-21 RX ORDER — GLYCOPYRROLATE 0.2 MG/ML
INJECTION INTRAMUSCULAR; INTRAVENOUS PRN
Status: DISCONTINUED | OUTPATIENT
Start: 2022-10-21 | End: 2022-10-21 | Stop reason: SDUPTHER

## 2022-10-21 RX ORDER — LIDOCAINE HYDROCHLORIDE 20 MG/ML
INJECTION, SOLUTION INTRAVENOUS PRN
Status: DISCONTINUED | OUTPATIENT
Start: 2022-10-21 | End: 2022-10-21 | Stop reason: SDUPTHER

## 2022-10-21 RX ORDER — SODIUM CHLORIDE 9 MG/ML
INJECTION, SOLUTION INTRAVENOUS CONTINUOUS PRN
Status: DISCONTINUED | OUTPATIENT
Start: 2022-10-21 | End: 2022-10-21 | Stop reason: SDUPTHER

## 2022-10-21 RX ORDER — PROPOFOL 10 MG/ML
INJECTION, EMULSION INTRAVENOUS PRN
Status: DISCONTINUED | OUTPATIENT
Start: 2022-10-21 | End: 2022-10-21 | Stop reason: SDUPTHER

## 2022-10-21 RX ORDER — SODIUM CHLORIDE 9 MG/ML
INJECTION, SOLUTION INTRAVENOUS PRN
Status: DISCONTINUED | OUTPATIENT
Start: 2022-10-21 | End: 2022-10-22 | Stop reason: HOSPADM

## 2022-10-21 RX ORDER — MIDAZOLAM HYDROCHLORIDE 1 MG/ML
INJECTION INTRAMUSCULAR; INTRAVENOUS PRN
Status: DISCONTINUED | OUTPATIENT
Start: 2022-10-21 | End: 2022-10-21 | Stop reason: SDUPTHER

## 2022-10-21 RX ORDER — ROCURONIUM BROMIDE 10 MG/ML
INJECTION, SOLUTION INTRAVENOUS PRN
Status: DISCONTINUED | OUTPATIENT
Start: 2022-10-21 | End: 2022-10-21 | Stop reason: SDUPTHER

## 2022-10-21 RX ORDER — SODIUM CHLORIDE 9 MG/ML
INJECTION, SOLUTION INTRAVENOUS CONTINUOUS
Status: ACTIVE | OUTPATIENT
Start: 2022-10-21 | End: 2022-10-21

## 2022-10-21 RX ORDER — SODIUM CHLORIDE 0.9 % (FLUSH) 0.9 %
5-40 SYRINGE (ML) INJECTION EVERY 12 HOURS SCHEDULED
Status: DISCONTINUED | OUTPATIENT
Start: 2022-10-21 | End: 2022-10-22 | Stop reason: HOSPADM

## 2022-10-21 RX ORDER — NEOSTIGMINE METHYLSULFATE 1 MG/ML
INJECTION, SOLUTION INTRAVENOUS PRN
Status: DISCONTINUED | OUTPATIENT
Start: 2022-10-21 | End: 2022-10-21 | Stop reason: SDUPTHER

## 2022-10-21 RX ORDER — MORPHINE SULFATE 2 MG/ML
1 INJECTION, SOLUTION INTRAMUSCULAR; INTRAVENOUS
Status: DISCONTINUED | OUTPATIENT
Start: 2022-10-21 | End: 2022-10-22

## 2022-10-21 RX ORDER — SODIUM CHLORIDE 9 MG/ML
INJECTION, SOLUTION INTRAVENOUS PRN
Status: DISCONTINUED | OUTPATIENT
Start: 2022-10-21 | End: 2022-10-21 | Stop reason: HOSPADM

## 2022-10-21 RX ORDER — ROSUVASTATIN CALCIUM 10 MG/1
10 TABLET, COATED ORAL DAILY
Status: DISCONTINUED | OUTPATIENT
Start: 2022-10-21 | End: 2022-10-22 | Stop reason: HOSPADM

## 2022-10-21 RX ORDER — MORPHINE SULFATE 2 MG/ML
2 INJECTION, SOLUTION INTRAMUSCULAR; INTRAVENOUS
Status: DISCONTINUED | OUTPATIENT
Start: 2022-10-21 | End: 2022-10-22

## 2022-10-21 RX ADMIN — ONDANSETRON HYDROCHLORIDE 4 MG: 2 SOLUTION INTRAMUSCULAR; INTRAVENOUS at 09:28

## 2022-10-21 RX ADMIN — SODIUM CHLORIDE, PRESERVATIVE FREE 10 ML: 5 INJECTION INTRAVENOUS at 21:00

## 2022-10-21 RX ADMIN — MORPHINE SULFATE 2 MG: 2 INJECTION, SOLUTION INTRAMUSCULAR; INTRAVENOUS at 10:30

## 2022-10-21 RX ADMIN — FENTANYL CITRATE 100 MCG: 50 INJECTION, SOLUTION INTRAMUSCULAR; INTRAVENOUS at 07:33

## 2022-10-21 RX ADMIN — Medication 50 MCG: at 07:46

## 2022-10-21 RX ADMIN — CEFAZOLIN 2000 MG: 2 INJECTION, POWDER, FOR SOLUTION INTRAMUSCULAR; INTRAVENOUS at 07:40

## 2022-10-21 RX ADMIN — FENTANYL CITRATE 25 MCG: 50 INJECTION, SOLUTION INTRAMUSCULAR; INTRAVENOUS at 08:20

## 2022-10-21 RX ADMIN — CEFAZOLIN 2000 MG: 2 INJECTION, POWDER, FOR SOLUTION INTRAMUSCULAR; INTRAVENOUS at 07:45

## 2022-10-21 RX ADMIN — SODIUM CHLORIDE: 9 INJECTION, SOLUTION INTRAVENOUS at 07:25

## 2022-10-21 RX ADMIN — Medication 200 MCG: at 08:19

## 2022-10-21 RX ADMIN — Medication 200 MCG: at 08:04

## 2022-10-21 RX ADMIN — DEXAMETHASONE SODIUM PHOSPHATE 10 MG: 10 INJECTION INTRAMUSCULAR; INTRAVENOUS at 07:35

## 2022-10-21 RX ADMIN — Medication 200 MCG: at 08:11

## 2022-10-21 RX ADMIN — LIDOCAINE HYDROCHLORIDE 60 MG: 20 INJECTION, SOLUTION INTRAVENOUS at 07:33

## 2022-10-21 RX ADMIN — Medication 100 MCG: at 07:48

## 2022-10-21 RX ADMIN — GLYCOPYRROLATE 0.6 MG: 0.2 INJECTION INTRAMUSCULAR; INTRAVENOUS at 09:31

## 2022-10-21 RX ADMIN — Medication 3 MG: at 09:30

## 2022-10-21 RX ADMIN — Medication 100 MCG: at 07:52

## 2022-10-21 RX ADMIN — PROPOFOL 150 MG: 10 INJECTION, EMULSION INTRAVENOUS at 07:33

## 2022-10-21 RX ADMIN — MIDAZOLAM 2 MG: 1 INJECTION INTRAMUSCULAR; INTRAVENOUS at 07:20

## 2022-10-21 RX ADMIN — Medication 200 MCG: at 08:29

## 2022-10-21 RX ADMIN — Medication 100 MCG: at 08:13

## 2022-10-21 RX ADMIN — PHENYLEPHRINE HYDROCHLORIDE 40 MCG/MIN: 10 INJECTION INTRAVENOUS at 08:19

## 2022-10-21 RX ADMIN — FENTANYL CITRATE 25 MCG: 50 INJECTION, SOLUTION INTRAMUSCULAR; INTRAVENOUS at 09:30

## 2022-10-21 RX ADMIN — SODIUM CHLORIDE: 9 INJECTION, SOLUTION INTRAVENOUS at 16:15

## 2022-10-21 RX ADMIN — SODIUM CHLORIDE 3 MG/HR: 9 INJECTION, SOLUTION INTRAVENOUS at 15:21

## 2022-10-21 RX ADMIN — ROCURONIUM BROMIDE 40 MG: 10 INJECTION, SOLUTION INTRAVENOUS at 07:33

## 2022-10-21 RX ADMIN — WATER 2000 MG: 1 INJECTION INTRAMUSCULAR; INTRAVENOUS; SUBCUTANEOUS at 16:56

## 2022-10-21 RX ADMIN — WATER 2000 MG: 1 INJECTION INTRAMUSCULAR; INTRAVENOUS; SUBCUTANEOUS at 23:56

## 2022-10-21 RX ADMIN — MORPHINE SULFATE 2 MG: 2 INJECTION, SOLUTION INTRAMUSCULAR; INTRAVENOUS at 10:25

## 2022-10-21 RX ADMIN — Medication 50 MCG: at 07:44

## 2022-10-21 RX ADMIN — Medication 200 MCG: at 07:58

## 2022-10-21 RX ADMIN — ROCURONIUM BROMIDE 10 MG: 10 INJECTION, SOLUTION INTRAVENOUS at 08:20

## 2022-10-21 RX ADMIN — SODIUM CHLORIDE 5 ML/HR: 9 INJECTION, SOLUTION INTRAVENOUS at 05:52

## 2022-10-21 RX ADMIN — PROTAMINE SULFATE 50 MG: 10 INJECTION, SOLUTION INTRAVENOUS at 09:26

## 2022-10-21 RX ADMIN — HEPARIN SODIUM 10000 UNITS: 1000 INJECTION INTRAVENOUS; SUBCUTANEOUS at 08:27

## 2022-10-21 ASSESSMENT — PAIN DESCRIPTION - DESCRIPTORS
DESCRIPTORS: ACHING;BURNING;DISCOMFORT
DESCRIPTORS: ACHING;BURNING

## 2022-10-21 ASSESSMENT — PAIN SCALES - GENERAL
PAINLEVEL_OUTOF10: 8
PAINLEVEL_OUTOF10: 0
PAINLEVEL_OUTOF10: 8

## 2022-10-21 ASSESSMENT — PAIN DESCRIPTION - LOCATION
LOCATION: BACK
LOCATION: BACK

## 2022-10-21 ASSESSMENT — LIFESTYLE VARIABLES: SMOKING_STATUS: 0

## 2022-10-21 ASSESSMENT — PAIN - FUNCTIONAL ASSESSMENT: PAIN_FUNCTIONAL_ASSESSMENT: 0-10

## 2022-10-21 NOTE — OP NOTE
Operative Note      Patient: Heriberto Jeong  YOB: 1960  MRN: [id number]    Date of Procedure: 10/21/2022    Pre-Op Diagnosis: Abdominal aortic aneurysm without rupture    Post-Op Diagnosis: Same       Procedure(s):  ABDOMINAL AORTIC ANEURYSM REPAIR ENDOVASCULAR, right and left percutaneous femoral artery access and closure. Surgeon(s):  Teodora Denis MD    Assistant:   Surgical Assistant: KAT Jackson - CNP  Resident: Cassius Crawley MD    Anesthesia: General    Estimated Blood Loss (mL): Minimal    Complications: None    Specimens:   * No specimens in log *    Implants:  Implant Name Type Inv. Item Serial No.  Lot No. LRB No. Used Action   GRAFT ENDOPROS L12CM DST [id number] CONTRALATERAL LEG W/ C3 - [id number] Endografts GRAFT ENDOPROS L12CM DST [id number] CONTRALATERAL LEG W/ C3 [id number]  [employer]  Left 1 Implanted   GRAFT EVAR L14CM AORT DIA28.5MM IL [id number] INFRARENAL TRUNK - [id number] Endografts GRAFT EVAR L14CM AORT DIA28.5MM IL [id number] INFRARENAL TRUNK [id number]  [employer]  Right 1 Implanted         Drains:   Urinary Catheter 10/21/22 Leal (Active)       Findings:     Detailed Description of Procedure: The patient was identified and the procedure was confirmed. The abdomen, groins and thighs were prepped and draped in the usual sterile fashion. Under ultrasound guidance, the right common femoral artery was percutaneously entered and a guidewire was advanced into the iliac artery. A 6-Hungarian sheath was inserted into the artery. The sheath was removed and two Perclose Proglide closure devices were pre-deployed and the sutures were secured. An 8-Hungarian was inserted into the artery. This repeated on the left side. The patient was heparinized maintaining an activated clotting time greater than 300 seconds. An aortogram was performed to measure the distance from the lowest renal artery to the internal iliac arteries.   The right sheath instrument counts were reported correct x2. The patient tolerated the procedure and was transferred to the CV-ICU in satisfactory condition. Crystal Delgado MD    CPT CODES: Endovascular repair of abdominal aortic aneurysm using modular stent graft (33961), percutaneous access and closure right femoral artery (72868), percutaneous access and closure left femoral artery (64485).      Electronically signed by Crystal Delgado MD on 10/21/2022 at 9:32 AM

## 2022-10-21 NOTE — H&P
Vascular Surgery H&P Note             Chief Complaint   Patient presents with    Circulatory Problem       Discuss scheduling  EVAR. HISTORY OF PRESENT ILLNESS:                 The patient is a 59 y.o. male who returns for follow-up evaluation of of his abdominal aortic aneurysm. He denies any problems since I saw him in July. Past Medical History:    Past Medical History             Diagnosis Date    Cancer ([facility]       lung    Diabetes mellitus ([facility]      Hyperlipidemia           Past Surgical History:    Past Surgical History             Procedure Laterality Date    CHOLECYSTECTOMY        LUNG REMOVAL, PARTIAL        TONSILLECTOMY             Current Medications:   Home Medications           Prior to Admission medications    Medication Sig Start Date End Date Taking?  Authorizing Provider   Cyanocobalamin (VITAMIN B 12 PO) Take by mouth     Yes Historical Provider, MD   Multiple Vitamins-Minerals (THERAPEUTIC MULTIVITAMIN-MINERALS) tablet Take 1 tablet by mouth daily     Yes Historical Provider, MD   FOLIC ACID PO Take by mouth     Yes Historical Provider, MD   simvastatin (ZOCOR) 5 MG tablet Take 5 mg by mouth nightly     Yes Historical Provider, MD   metFORMIN (GLUCOPHAGE) 500 MG tablet Take 500 mg by mouth 2 times daily 12/16/21   Yes Historical Provider, MD         Allergies:  Penicillins     Social History               Socioeconomic History    Marital status:        Spouse name: Not on file    Number of children: Not on file    Years of education: Not on file    Highest education level: Not on file   Occupational History    Not on file   Tobacco Use    Smoking status: Former       Packs/day: 1.00       Years: 47.00       Pack years: 47.00       Types: Cigarettes       Start date: 0       Quit date: 06/2019       Years since quitting: 3.2    Smokeless tobacco: Never    Tobacco comments:       Per CT Lung Screening order   Vaping Use    Vaping Use: Never used   Substance and Sexual Activity    Alcohol use: Not Currently    Drug use: Not Currently    Sexual activity: Not on file   Other Topics Concern    Not on file   Social History Narrative    Not on file      Social Determinants of Health      Financial Resource Strain: Not on file   Food Insecurity: Not on file   Transportation Needs: Not on file   Physical Activity: Not on file   Stress: Not on file   Social Connections: Not on file   Intimate Partner Violence: Not on file   Housing Stability: Not on file            Family History   No family history on file.         REVIEW OF SYSTEMS (New symptoms):     Eyes:                                       Blurred vision:             No [x]/Yes []                                                  Diplopia:                      No [x]/Yes []                                                  Vision loss:                  No [x]/Yes []   Ears, nose, throat:                Hearing loss:               No [x]/Yes []                                                  Vertigo:                        No [x]/Yes []                                                  Swallowing problem:   No [x]/Yes []                                                  Nose bleeds:               No [x]/Yes []                                                  Voice hoarseness:      No [x]/Yes []  Respiratory:                           Cough:                        No [x]/Yes []                                                  Pleuritic chest pain:     No [x]/Yes []                                                  Dyspnea:                     No [x]/Yes []                                                  Wheezing:                   No [x]/Yes []  Cardiovascular:                     Angina:                        No [x]/Yes []                                                  Palpitations:                No [x]/Yes []                                                  Claudication:               No [x]/Yes [] Leg swelling:               No [x]/Yes []  Gastrointestinal:                   Nausea or vomiting:    No [x]/Yes []                                                  Abdominal pain:          No [x]/Yes []                                                  Intestinal bleeding:      No [x]/Yes []  Musculoskeletal:                   Leg pain:                     No [x]/Yes []                                                  Back pain:                   No [x]/Yes []                                                  Weakness:                  No [x]/Yes []  Neurologic:                            Numbness:                  No [x]/Yes []                                                  Paralysis:                    No [x]/Yes []                                                  Headaches:                 No [x]/Yes []  Hematologic, lymphatic:      Anemia:                       No [x]/Yes []                                                  Bleeding or bruising:   No [x]/Yes []                                                  Fevers or chills:           No [x]/Yes []  Endocrine:                             Temp intolerance:       No [x]/Yes []                                                  Polydipsia, polyuria:    No [x]/Yes []  Skin:                                       Rash:                           No [x]/Yes []                                                  Ulcers:                         No [x]/Yes []                                                  Abnorm pigment:        No [x]/Yes []  :                                          Frequency/urgency:    No [x]/Yes []                                                  Hematuria:                  No [x]/Yes []                                                  Incontinence:               No [x]/Yes []     PHYSICAL EXAM:  There were no vitals filed for this visit.   General Appearance: alert and oriented to person, place and time, in no acute distress, well developed and well- nourished  Neurologic: no cranial nerve deficit, speech normal  Head: normocephalic and atraumatic  Eyes: extraocular eye movements intact, conjunctivae normal  ENT: external ear and ear canal normal bilaterally, nose without deformity, no carotid bruits  Pulmonary/Chest: normal air movement, no respiratory distress  Cardiovascular: normal rate, regular rhythm, no murmur  Abdomen: non-distended, no masses  Musculoskeletal: no joint deformity or tenderness  Extremities: no leg edema bilaterally  Skin: warm and dry, no rash or erythema     PULSE EXAM      Right      Left   Brachial       Radial 3 3   Femoral       Popliteal       Dorsalis Pedis  2  2   Posterior Tibial 2 2   (3=normal, 2=diminished, 1=barely palpable, 4=widened)     RADIOLOGY: [facility] today     Problem List Items Addressed This Visit         AAA (abdominal aortic aneurysm) without rupture (HCC) - Primary         I reviewed the plan for endovascular aneurysm repair with the patient. I reviewed the procedure with the patient. I discussed the risks, benefits, and alternatives of the procedure. The patient understands and consents. All questions were answered.     Electronically signed by Chuck Singh MD on 10/21/2022 at 7:08 AM

## 2022-10-21 NOTE — ANESTHESIA PROCEDURE NOTES
Arterial Line:    An arterial line was placed using surface landmarks, in the OR for the following indication(s): continuous blood pressure monitoring and blood sampling needed. A 20 gauge (size), 1 and 3/4 inch (length), Arrow (type) catheter was placed, Seldinger technique not used, into the right radial artery, secured by tape and Tegaderm. Anesthesia type: General    Events:  patient tolerated procedure well with no complications and EBL 0mL. Anesthesiologist: Mago Barbosa DO  Resident/CRNA: KAT Thacker - JAZMIN  Other anesthesia staff: Anuj Bass RN  Performed:  Other anesthesia staff   Preanesthetic Checklist  Completed: patient identified, IV checked, site marked, risks and benefits discussed, surgical/procedural consents, equipment checked, pre-op evaluation, timeout performed, anesthesia consent given, oxygen available, monitors applied/VS acknowledged, fire risk safety assessment completed and verbalized and blood product R/B/A discussed and consented

## 2022-10-21 NOTE — PROGRESS NOTES
CVICU Admission Note    Name: Heriberto Jeong  MRN: [id number]    CC: Postoperative Critical Care Management     Indication for Surgery/Procedure: AAA without rupture     Important/Relevant PMH/PSH: HPL, DM, h/o lung CA s/p LYDIA Lobectomy, former smoker     Procedure/Surgeries: 10/21/2022 ABDOMINAL AORTIC ANEURYSM REPAIR ENDOVASCULAR, right and left percutaneous femoral artery access and closure. Physical Exam:    BP (!) 104/54   Pulse 76   Temp 97 °F (36.1 °C) (Temporal)   Resp 16   Ht 5' 8\" (1.727 m)   Wt 163 lb (73.9 kg)   SpO2 99%   BMI 24.78 kg/m²     General Appearance: Pt seen in PACU. Drowsy, following commands. Hemodynamically stable   Eyes: PERRL  Pulmonary: No wheezes, no accessory muscle use noted   Cardiovascular: RRR, no heaves or thrills palpated   Telemetry: SR   Abdomen: Soft  Extremities:  Palpable pulses all extremities, no edema   Neurologic/Psych: MAEx4 to command   Skin: Warm and dry    Incision: Bilateral groins puncture sites well approximated, soft        Assessment/Plan: Day of Surgery     1.  AAA S/p EVAR   - Frequent neurovascular checks, monitor incision for signs of swelling/hematoma   - NPO for now, advance diet once more awake; IVF @125cc/hr  - Leal catheter to GD  - Bedrest  - Perioperative Ancef  - PRN morphine/oxycodone for pain management   - Maintain SBP <160, start PRN Nicardipine gtt if needed         Electronically signed by KAT Gallegos - CNP on 10/21/2022 at 9:57 AM

## 2022-10-21 NOTE — PROGRESS NOTES
Vascular Surgery Progress Note    CC: f/u EVAR    HISTORY:  The patient is awake, alert, and oriented. Pain controlled. No complaints. IMPRESSION:  POD # 0 s/p EVAR. PLAN:  Will admit patient for continued monitored care. Pt at risk for femoral artery bleeding and not appropriate for discharge/outpatient. Patient Active Problem List   Diagnosis Code    Gallbladder mass K82.8    AAA (abdominal aortic aneurysm) without rupture I71.40    S/P AAA repair Z98.890, Z86.79    S/P AAA repair using bifurcation graft Z95.828, Z86.79       Current Medications:    sodium chloride      sodium chloride      niCARdipene (CARDENE) 50 mg in 250 mL 0.9 % sodium chloride infusion        sodium chloride flush, sodium chloride, acetaminophen, oxyCODONE, morphine **OR** morphine    rosuvastatin  10 mg Oral Daily    sodium chloride flush  5-40 mL IntraVENous 2 times per day    ceFAZolin  2,000 mg IntraVENous Q8H          PHYSICAL EXAM:    Vitals:    10/21/22 1500   BP:    Pulse: 71   Resp: 15   Temp:    SpO2: 100%     CONSTITUTIONAL:  awake, alert, cooperative, no apparent distress  LUNGS:  no increased work of breathing, good air exchange and clear to auscultation  CARDIOVASCULAR:  regular rate and rhythm and dorsalis pedis and posterior tibial pulses 2+ bilaterally  ABDOMEN:  soft, non-distended and non-tender  Groin access site CDI, no swelling.     LABS:    Lab Results   Component Value Date    WBC 5.8 10/14/2022    HGB 12.9 10/14/2022    HCT 37.1 10/14/2022     10/14/2022    PROTIME 12.4 10/14/2022    INR 1.1 10/14/2022    APTT 27.9 04/13/2022    K 4.4 10/14/2022    BUN 25 (H) 10/14/2022    CREATININE 1.0 10/14/2022       RADIOLOGY:

## 2022-10-21 NOTE — ANESTHESIA PRE PROCEDURE
Department of Anesthesiology  Preprocedure Note       Name:  Jyoti Pimentel   Age:  57 y.o.  :  1960                                          MRN:  [id number]         Date:  10/21/2022      Surgeon: Sonia Chery):  Jayro Underwood MD    Procedure: Procedure(s):  ABDOMINAL AORTIC ANEURYSM REPAIR ENDOVASCULAR    Medications prior to admission:   Prior to Admission medications    Medication Sig Start Date End Date Taking? Authorizing Provider   DULoxetine (CYMBALTA) 30 MG extended release capsule Take 30 mg by mouth daily 9/10/22   Historical Provider, MD   rosuvastatin (CRESTOR) 10 MG tablet Take 10 mg by mouth daily 22   Historical Provider, MD   Cyanocobalamin (VITAMIN B 12 PO) Take by mouth    Historical Provider, MD   Multiple Vitamins-Minerals (THERAPEUTIC MULTIVITAMIN-MINERALS) tablet Take 1 tablet by mouth daily    Historical Provider, MD   FOLIC ACID PO Take by mouth    Historical Provider, MD   metFORMIN (GLUCOPHAGE) 500 MG tablet Take 500 mg by mouth 2 times daily 21   Historical Provider, MD       Current medications:    Current Facility-Administered Medications   Medication Dose Route Frequency Provider Last Rate Last Admin    sodium chloride flush 0.9 % injection 5-40 mL  5-40 mL IntraVENous 2 times per day Irene Chaidez PA-C        sodium chloride flush 0.9 % injection 5-40 mL  5-40 mL IntraVENous PRN Irene Chaidez PA-C        0.9 % sodium chloride infusion   IntraVENous PRN Berta Sweeney PA-C 5 mL/hr at 10/21/22 0552 5 mL/hr at 10/21/22 0552    ceFAZolin (ANCEF) 2,000 mg in sterile water 20 mL IV syringe  2,000 mg IntraVENous See Admin Instructions Irene Chaidez PA-C           Allergies:     Allergies   Allergen Reactions    Penicillins        Problem List:    Patient Active Problem List   Diagnosis Code    Gallbladder mass K82.8    AAA (abdominal aortic aneurysm) without rupture I71.40       Past Medical History:        Diagnosis Date    [facility] [address]     lung    [facility] mellitus [facility] [address]     Hyperlipidemia        Past Surgical History:        Procedure Laterality Date    CHOLECYSTECTOMY      LUNG REMOVAL, PARTIAL      TONSILLECTOMY         Social History:    Social History     Tobacco Use    Smoking status: Former     Packs/day: 1.00     Years: 47.00     Pack years: 47.00     Types: Cigarettes     Start date: 0     Quit date: 06/2019     Years since quitting: 3.3    Smokeless tobacco: Never    Tobacco comments:     Per CT Lung Screening order   Substance Use Topics    Alcohol use: Not Currently                                Counseling given: Not Answered  Tobacco comments: Per CT Lung Screening order      Vital Signs (Current):   Vitals:    10/21/22 0536   BP: 132/69   Pulse: 74   Resp: 18   Temp: 36.2 °C (97.2 °F)   TempSrc: Temporal   SpO2: 99%   Weight: 163 lb (73.9 kg)   Height: 5' 8\" (1.727 m)                                              BP Readings from Last 3 Encounters:   10/21/22 132/69   10/14/22 115/60   04/13/22 108/64       NPO Status: Time of last liquid consumption: 2000                        Time of last solid consumption: 2000                        Date of last liquid consumption: 10/20/22                        Date of last solid food consumption: 10/20/22    BMI:   Wt Readings from Last 3 Encounters:   10/21/22 163 lb (73.9 kg)   10/14/22 163 lb (73.9 kg)   04/13/22 169 lb (76.7 kg)     Body mass index is 24.78 kg/m².     CBC:   Lab Results   Component Value Date/Time    WBC 5.8 10/14/2022 12:15 PM    RBC 4.30 10/14/2022 12:15 PM    HGB 12.9 10/14/2022 12:15 PM    HCT 37.1 10/14/2022 12:15 PM    MCV 86.3 10/14/2022 12:15 PM    RDW 12.7 10/14/2022 12:15 PM     10/14/2022 12:15 PM       CMP:   Lab Results   Component Value Date/Time     10/14/2022 12:15 PM    K 4.4 10/14/2022 12:15 PM     10/14/2022 12:15 PM    CO2 25 10/14/2022 12:15 PM    BUN 25 10/14/2022 12:15 PM    CREATININE 1.0 10/14/2022 12:15 PM    GFRAA >60 10/14/2022 12:15 PM    LABGLOM >60 10/14/2022 12:15 PM    GLUCOSE 158 10/14/2022 12:15 PM    PROT 7.5 04/13/2022 06:31 PM    CALCIUM 9.7 10/14/2022 12:15 PM    BILITOT 0.8 04/13/2022 06:31 PM    ALKPHOS 75 04/13/2022 06:31 PM    AST 29 04/13/2022 06:31 PM    ALT 47 04/13/2022 06:31 PM       POC Tests: No results for input(s): POCGLU, POCNA, POCK, POCCL, POCBUN, POCHEMO, POCHCT in the last 72 hours. Coags:   Lab Results   Component Value Date/Time    PROTIME 12.4 10/14/2022 12:15 PM    INR 1.1 10/14/2022 12:15 PM    APTT 27.9 04/13/2022 06:31 PM       HCG (If Applicable): No results found for: PREGTESTUR, PREGSERUM, HCG, HCGQUANT     ABGs: No results found for: PHART, PO2ART, PCO7KBV, XAM7LCD, BEART, D3WIBIIB     Type & Screen (If Applicable):  No results found for: LABABO, LABRH    Drug/Infectious Status (If Applicable):  No results found for: HIV, HEPCAB    COVID-19 Screening (If Applicable): No results found for: COVID19        Anesthesia Evaluation  Patient summary reviewed and Nursing notes reviewed no history of anesthetic complications:   Airway: Mallampati: III  TM distance: >3 FB   Neck ROM: full  Mouth opening: > = 3 FB   Dental:          Pulmonary:       (-) not a current smoker (former 50 pack/year)                          ROS comment: Previous left upper lobectomy for CA   Cardiovascular:  Exercise tolerance: good (>4 METS),   (+) hyperlipidemia      ECG reviewed               Beta Blocker:  Not on Beta Blocker         Neuro/Psych:                ROS comment: Bilateral foot neuropathy GI/Hepatic/Renal: Neg GI/Hepatic/Renal ROS            Endo/Other:    (+) DiabetesType II DM, well controlled, , .                 Abdominal:             Vascular:           ROS comment: AAA.  Other Findings:        Impression   Chest: No evidence for recurrent or metastatic disease in the chest       Abdomen and pelvis: Stable appearance of liver lesions 2 of which are   indeterminate hypoenhancing as seen on 11/13/2021 CT chest comparison without   dedicated abdominal imaging available for comparison.  No interval change   given differences in technique and available imaging without definitive   progression of metastatic disease in the abdomen pelvis otherwise       Patent atherosclerotic aneurysmal abdominal aorta measuring up to 5.6 cm in   maximum transaxial diameter with moderate intramural thrombus formation. Given size recommend follow-up within 6-12 months versus vascular   consultation with CTA recommended for further definitive evaluation. Impression   Distal aneurysmal abdominal aorta 5.2 x 5.1 cm increased from prior where   this was 4.8 x 4.4 cm     EXAMINATION:   CT OF THE CHEST WITH CONTRAST; CT OF THE ABDOMEN AND PELVIS WITH CONTRAST   7/2/2022 9:17 am       TECHNIQUE:   CT of the chest was performed with the administration of intravenous   contrast. Multiplanar reformatted images are provided for review. Automated   exposure control, iterative reconstruction, and/or weight based adjustment of   the mA/kV was utilized to reduce the radiation dose to as low as reasonably   achievable.; CT of the abdomen and pelvis was performed with the   administration of intravenous contrast. Multiplanar reformatted images are   provided for review. Automated exposure control, iterative reconstruction,   and/or weight based adjustment of the mA/kV was utilized to reduce the   radiation dose to as low as reasonably achievable.        COMPARISON:   CT chest with contrast dated 11/13/2021       HISTORY:   ORDERING SYSTEM PROVIDED HISTORY: Malignant neoplasm of upper lobe, left   bronchus or lung ([address]   TECHNOLOGIST PROVIDED HISTORY:   STAT Creatinine as needed:->No; ORDERING SYSTEM PROVIDED HISTORY: Malignant   neoplasm of upper lobe, left bronchus or lung [facility])   TECHNOLOGIST PROVIDED HISTORY:   Additional Contrast?->Oral   STAT Creatinine as needed:->No       FINDINGS:       Chest:       Mediastinum: Thyroid is homogeneous in attenuation. No bulky mediastinal   adenopathy. Central airways are patent. Esophagus is normal course and   caliber. Patent nonaneurysmal thoracic aorta. Cardiac size within normal   limits without pericardial effusion. Lungs/pleura: Status post left upper lobectomy and resection of previously   noted pulmonary nodule. No evidence for new nodule or mass otherwise in the   lungs to suggest recurrent disease. Soft Tissues/Bones: No acute osseous or soft tissue findings. No aggressive   osseous lesion. Abdomen/Pelvis:       Organs: Liver contains relatively stable 14 mm low-attenuation focus in the   right hepatic lobe along with separate 15 mm focus posteriorly series 8,   image 34 visualizing both of these in the same slice stable from prior   comparison somewhat low attenuation indeterminate with a hepatic cyst more   anteriorly adjacent the right hepatic lobe. No new suspicious liver lesion. Gallbladder surgically absent. Pancreas and spleen unremarkable. Adrenals   without nodule. Kidneys without suspicious renal lesion and no   hydronephrosis. GI/Bowel: Small hiatal hernia. No focal thickening or disproportion   dilatation of bowel. No inflammatory findings. Appendix is unremarkable and   visualized in the right lower quadrant. Pelvis: No suspicious pelvic lesion or bulky pelvic adenopathy/free fluid. Peritoneum/Retroperitoneum: Patent atherosclerotic aneurysmal abdominal aorta   measuring up to 5.6 cm in maximum transaxial diameter with moderate   intramural thrombus formation. No bulky retroperitoneal adenopathy. Bones/Soft Tissues: No acute osseous or soft tissue findings.            Impression   Chest: No evidence for recurrent or metastatic disease in the chest       Abdomen and pelvis: Stable appearance of liver lesions 2 of which are   indeterminate hypoenhancing as seen on 11/13/2021 CT chest comparison without   dedicated abdominal imaging available for comparison. No interval change   given differences in technique and available imaging without definitive   progression of metastatic disease in the abdomen pelvis otherwise       Patent atherosclerotic aneurysmal abdominal aorta measuring up to 5.6 cm in   maximum transaxial diameter with moderate intramural thrombus formation. Given size recommend follow-up within 6-12 months versus vascular   consultation with CTA recommended for further definitive evaluation. RECOMMENDATIONS:   Unavailable                      Anesthesia Plan      general     ASA 4         arterial line    Anesthetic plan and risks discussed with patient. Use of blood products discussed with patient whom consented to blood products. Tres Carreno RN   10/21/2022      Patient seen and examined, chart reviewed, agree with above findings. Anesthetic plan, risks, benefits, alternatives, and personnel involved discussed with patient. Patient verbalized an understanding and agreed to proceed. NPO status confirmed. Anesthetic plan discussed with care team members and agreed upon.     Nicole Hermosillo DO   10/21/2022  7:24 AM

## 2022-10-21 NOTE — DISCHARGE INSTRUCTIONS
Discharge Instructions for Endovascular Repair of Abdominal Aortic Aneurysm       Call Dr. Charlie Mancia office [phone] for follow-up appointment. During endovascular repair, the doctor guides catheters through both groin arteries up to the aortic aneurysm in the abdomen. A stent graft is placed in the weakened area to strengthen it. Steps to Take   Home Care    Follow these guidelines after surgery:   Rest. Try to move as tolerated. A mix of rest and light activity improves healing. The incision area may be sore for a few days. To minimize pain and soreness: Take pain medicine as directed. Avoid strenuous activity and heavy lifting. Keep the incision area clean and dry. Follow your doctor's instructions for changing the bandages, such as:   Wash your hands thoroughly. Cleanse the site with lukewarm water and mild soap. Use a soft wash cloth to gently wipe the incision area. Gently sponge bathe or shower. Do not scrub the incision areas. Avoid baths or swimming for one week. Diet    You can return to your regular diet. You may work with a dietician who will help you follow a heart-healthy diet. Physical Activity    You will feel sore after the surgery. Try to walk steadily within two weeks. You may be able to return to normal activities within 1-3 weeks. While recovering, you will need to avoid strenuous activities, like heavy lifting. Ask your doctor when you will be able to return to work. Do not drive unless your doctor has given you permission to do so. Medications    Your doctor may recommend:   Over-the-counter or prescription pain medicine   Aspirin or a cholesterol-lowering drug to prevent complications   If you had to stop medicines before the procedure, ask your doctor when you can start again.  Medicines that may have been stopped include:   Anti-inflammatory drugs (eg, aspirin, ibuprofen)   Blood thinners, like warfarin (Coumadin)   Clopidogrel (Plavix)   When taking medicines:   Take your medicine as directed. Do not change the amount or the schedule. Do not stop taking them without talking to your doctor. Do not share them. Know what results and side effects to look for. Report them to your doctor. Some drugs can be dangerous when mixed. Talk to a doctor or pharmacist if you are taking more than one drug. This includes over-the-counter medicines and herb or dietary supplements. Plan ahead for refills so you do not run out. Lifestyle Changes    You and your doctor will plan lifestyle changes that will help you recover. Some things to keep in mind include: Atherosclerosis and high blood pressure should be carefully managed. This can be done with medicines and a healthy lifestyle. If you smoke, talk to your doctor about quitting. Follow-up   To check for stent graft problems, schedule regular follow-up testing as directed by your doctor. Be sure to go to all of your appointments. Call Your Doctor If Any of the Following Occurs   After you leave the hospital, call your doctor if any of the following occurs:   Redness, swelling, increasing pain, excessive bleeding, or discharge at the incision site   Signs of infection, including fever and chills   New abdominal pain   Back pain   Any change of color or sensation in your legs or feet   Burning, pain, or other problems when urinating   Nausea or vomiting   Abdominal cramps or diarrhea   Unusual fatigue or depression   Disorientation or confusion   Numbness or tingling in the legs   New, unexplained symptoms   Cough   Call 911 or go to the emergency room right away if you have:   Shortness of breath   Chest pain   If you think you have an emergency,  CALL 911.

## 2022-10-21 NOTE — PROGRESS NOTES
Pt arrived to CVICU 3816 post EVAR. Pt connected to monitor and vitals obtained. Arterial line leveled and zeroed. Pt with no complaints. Assessment completed at this time.

## 2022-10-21 NOTE — ANESTHESIA POSTPROCEDURE EVALUATION
Department of Anesthesiology  Postprocedure Note    Patient: Sandeep Powell  MRN: [id number]  YOB: 1960  Date of evaluation: 10/21/2022      Procedure Summary     Date: 10/21/22 Room / Location: [facility] OR 03 / [facility]    Anesthesia Start: 0725 Anesthesia Stop: 9808    Procedure: ABDOMINAL AORTIC ANEURYSM REPAIR ENDOVASCULAR (Bilateral: Groin) Diagnosis:       Abdominal aortic aneurysm without rupture      (Abdominal aortic aneurysm without rupture)    Surgeons: Noble Serrano MD Responsible Provider: Michelle Lucero DO    Anesthesia Type: General ASA Status: 4          Anesthesia Type: General    Safia Phase I: Safia Score: 10    Safia Phase II:        Anesthesia Post Evaluation    Patient location during evaluation: PACU  Patient participation: complete - patient participated  Level of consciousness: awake and alert  Pain score: 1  Airway patency: patent  Nausea & Vomiting: no nausea and no vomiting  Complications: no  Cardiovascular status: hemodynamically stable  Respiratory status: acceptable  Hydration status: euvolemic

## 2022-10-22 VITALS
OXYGEN SATURATION: 99 % | RESPIRATION RATE: 18 BRPM | HEIGHT: 68 IN | HEART RATE: 68 BPM | DIASTOLIC BLOOD PRESSURE: 75 MMHG | SYSTOLIC BLOOD PRESSURE: 122 MMHG | TEMPERATURE: 98 F | WEIGHT: 163 LBS | BODY MASS INDEX: 24.71 KG/M2

## 2022-10-22 NOTE — PLAN OF CARE
Problem: Discharge Planning  Goal: Discharge to home or other facility with appropriate resources  Outcome: Progressing  Flowsheets  Taken 10/21/2022 2000 by Jenny Little RN  Discharge to home or other facility with appropriate resources: Identify barriers to discharge with patient and caregiver  Taken 10/21/2022 1235 by Markel Hutchinson RN  Discharge to home or other facility with appropriate resources: Identify barriers to discharge with patient and caregiver     Problem: Pain  Goal: Verbalizes/displays adequate comfort level or baseline comfort level  Outcome: Progressing     Problem: Chronic Conditions and Co-morbidities  Goal: Patient's chronic conditions and co-morbidity symptoms are monitored and maintained or improved  Outcome: Progressing  Flowsheets  Taken 10/21/2022 2000 by Julia Chew 34 - Patient's Chronic Conditions and Co-Morbidity Symptoms are Monitored and Maintained or Improved:   Monitor and assess patient's chronic conditions and comorbid symptoms for stability, deterioration, or improvement   Collaborate with multidisciplinary team to address chronic and comorbid conditions and prevent exacerbation or deterioration  Taken 10/21/2022 1235 by Julia Carranza 34 - Patient's Chronic Conditions and Co-Morbidity Symptoms are Monitored and Maintained or Improved:   Monitor and assess patient's chronic conditions and comorbid symptoms for stability, deterioration, or improvement   Collaborate with multidisciplinary team to address chronic and comorbid conditions and prevent exacerbation or deterioration

## 2022-10-22 NOTE — PROGRESS NOTES
Vascular Surgery Progress Note    CC: f/u EVAR    HISTORY:  Pt states he is feeling great and wants to go home today. IMPRESSION:  POD #1 s/p EVAR. PLAN:    - Okay for regular diet as tolerated  - Remove collins and A-line  - Pain control PRN  - Okay for OOB  - DC home today    Patient Active Problem List   Diagnosis Code    Gallbladder mass K82.8    AAA (abdominal aortic aneurysm) without rupture I71.40    S/P AAA repair Z98.890, Z86.79    S/P AAA repair using bifurcation graft Z95.828, Z86.79       Current Medications:    sodium chloride      niCARdipene (CARDENE) 50 mg in 250 mL 0.9 % sodium chloride infusion Stopped (10/21/22 1835)      sodium chloride flush, sodium chloride, acetaminophen, oxyCODONE, morphine **OR** morphine    rosuvastatin  10 mg Oral Daily    sodium chloride flush  5-40 mL IntraVENous 2 times per day          PHYSICAL EXAM:    Vitals:    10/21/22 2300   BP:    Pulse: 83   Resp: 18   Temp:    SpO2: 97%     CONSTITUTIONAL:  awake, alert, cooperative, no apparent distress  LUNGS:  no increased work of breathing, good air exchange and clear to auscultation  CARDIOVASCULAR:  RR and normotensive. Posterior tibial pulses 2+ bilaterally  ABDOMEN:  soft, non-distended and non-tender  Groin access site CDI, no swelling.     LABS:    Lab Results   Component Value Date    WBC 5.8 10/14/2022    HGB 12.9 10/14/2022    HCT 37.1 10/14/2022     10/14/2022    PROTIME 12.4 10/14/2022    INR 1.1 10/14/2022    APTT 27.9 04/13/2022    K 4.4 10/14/2022    BUN 25 (H) 10/14/2022    CREATININE 1.0 10/14/2022       RADIOLOGY:

## 2022-10-22 NOTE — FLOWSHEET NOTE
4561 Patient given discharge instructions,verbalized understanding of.  0900 Discharged via w/c with RN and family member.

## 2022-10-22 NOTE — PROGRESS NOTES
Arterial line pulled out without any complications. Leal catheter removed without any complications.

## 2022-10-22 NOTE — PLAN OF CARE
Problem: Discharge Planning  Goal: Discharge to home or other facility with appropriate resources  10/22/2022 0932 by Eula Giraldo RN  Outcome: Completed  10/22/2022 0120 by Bhumi Roche RN  Outcome: Progressing  Flowsheets  Taken 10/21/2022 2000 by Bhumi Roche RN  Discharge to home or other facility with appropriate resources: Identify barriers to discharge with patient and caregiver  Taken 10/21/2022 1235 by Shashank Barnes RN  Discharge to home or other facility with appropriate resources: Identify barriers to discharge with patient and caregiver     Problem: Pain  Goal: Verbalizes/displays adequate comfort level or baseline comfort level  10/22/2022 0932 by Eula Giraldo RN  Outcome: Completed  10/22/2022 0120 by Bhumi Roche RN  Outcome: Progressing     Problem: Chronic Conditions and Co-morbidities  Goal: Patient's chronic conditions and co-morbidity symptoms are monitored and maintained or improved  10/22/2022 0932 by Eula Giraldo RN  Outcome: Completed  10/22/2022 0120 by Bhumi Roche RN  Outcome: Progressing  Flowsheets  Taken 10/21/2022 2000 by Bhumi Roche RN  Care Plan - Patient's Chronic Conditions and Co-Morbidity Symptoms are Monitored and Maintained or Improved:   Monitor and assess patient's chronic conditions and comorbid symptoms for stability, deterioration, or improvement   Collaborate with multidisciplinary team to address chronic and comorbid conditions and prevent exacerbation or deterioration  Taken 10/21/2022 1235 by Julia Smith 34 - Patient's Chronic Conditions and Co-Morbidity Symptoms are Monitored and Maintained or Improved:   Monitor and assess patient's chronic conditions and comorbid symptoms for stability, deterioration, or improvement   Collaborate with multidisciplinary team to address chronic and comorbid conditions and prevent exacerbation or deterioration

## 2022-10-24 LAB
POC ACT LR: 154 SECONDS
POC ACT LR: 157 SECONDS
POC ACT LR: 277 SECONDS
POC ACT LR: 334 SECONDS

## 2022-10-24 NOTE — ADT AUTH CERT
Aortic Aneurysm, Abdominal, Endovascular Repair - Care Day 1 (10/21/2022) by Olaf Metz RN       Review Status Review Entered   Completed 10/24/2022 1003       Created By   Olaf Metz RN      Criteria Review      Care Day: 1 Care Date: 10/21/2022 Level of Care: Telemetry    Guideline Day 1    Level Of Care    (X) ICU, telemetry, or floor    10/24/2022 10:03 AM EDT by Heriberto Espinoza      [email or web address] tele    Clinical Status    ( ) * Clinical Indications met    Activity    (X) Bed rest    10/24/2022 10:03 AM EDT by Heriberto Espinoza      strict bedrest    ( ) Up to chair as tolerated postoperatively    10/24/2022 10:03 AM EDT by Heriberto Espinoza      strict bedrest    Routes    (X) IV or oral fluids    10/24/2022 10:03 AM EDT by Heriberto Espinoza      0.9% 125 ml hr iv continuous dcd 2229    (X) IV or oral medications    10/24/2022 10:03 AM EDT by Heriberto Espinoza      cardene drip 15 ml hr continuous iv    (X) Advance from clear liquids to usual diet as tolerated    10/24/2022 10:03 AM EDT by Heriberto Espinoza      adult reg diet after procedure    Interventions    (X) Vascular checks    Medications    (X) Prophylactic antibiotics    10/24/2022 10:03 AM EDT by Heriberto Espinoza      ancef 2000 mg iv q 8 x 3 doses    * Milestone   Additional Notes   DATE: 10/21/2022 OR TO [employer] TELE          Order Status:       INPATIENT       Preop/Post-op Dx:   Abdominal aortic aneurysm without rupture          Procedure/Post-op Note:        Date of Procedure: 10/21/2022       Pre-Op Diagnosis: Abdominal aortic aneurysm without rupture       Post-Op Diagnosis: Same         Procedure(s):   ABDOMINAL AORTIC ANEURYSM REPAIR ENDOVASCULAR, right and left percutaneous femoral artery access and closure. Anesthesia: General       Estimated Blood Loss (mL): Minimal       Complications: None       Specimens:    * No specimens in log *       Implants:   Implant Name Type Inv.  Item Serial No.  Lot No. LRB No. Used Action   GRAFT ENDOPROS L12CM DST [id number] CONTRALATERAL LEG W/ C3 - [id number] Endografts GRAFT ENDOPROS L12CM DST [id number] CONTRALATERAL LEG W/ C3 [id number] [employer]WD Left 1 Implanted   GRAFT EVAR L14CM AORT DIA28.5MM [place] [id number] INFRARENAL TRUNK - [id number] Endografts GRAFT EVAR L14CM AORT DIA28.5MM IL [id number] INFRARENAL TRUNK [id number] [employer] Right 1 Implanted            Drains:    Urinary Catheter 10/21/22 Leal (Active)           ASA Score:      4      Complications:    None          Vitals Post-op:       98.3 oral 18 101 114/52 96% on ra          Postop Orders:      Tele monitoring labs adult reg diet             Aortic Aneurysm, Abdominal, Endovascular Repair - Clinical Indications for Procedure by Korey Courtney RN       Review Status Review Entered   Completed 10/24/2022 0958       Created By   Korey Courtney RN      Criteria Review      Clinical Indications for Procedure    Most Recent : Makayla Doherty Most Recent Date: 10/24/2022 9:58 AM EDT     (X) Other Indication: Abdominal aortic aneurysm without rupture      Entered 10/24/2022 9:58 AM EDT by Makayla Doherty     Abdominal aortic aneurysm without rupture

## 2022-10-31 ENCOUNTER — TELEPHONE (OUTPATIENT)
Dept: VASCULAR SURGERY | Age: 62
End: 2022-10-31

## 2022-11-01 ENCOUNTER — OFFICE VISIT (OUTPATIENT)
Dept: VASCULAR SURGERY | Age: 62
End: 2022-11-01

## 2022-11-01 VITALS — WEIGHT: 162 LBS | BODY MASS INDEX: 24.55 KG/M2 | HEIGHT: 68 IN

## 2022-11-01 DIAGNOSIS — Z95.828 HISTORY OF ENDOVASCULAR STENT GRAFT FOR ABDOMINAL AORTIC ANEURYSM: Primary | ICD-10-CM

## 2022-11-01 PROCEDURE — 99024 POSTOP FOLLOW-UP VISIT: CPT | Performed by: NURSE PRACTITIONER

## 2022-11-18 ENCOUNTER — HOSPITAL ENCOUNTER (OUTPATIENT)
Dept: CT IMAGING | Age: 62
Discharge: HOME | End: 2022-11-20
Payer: COMMERCIAL

## 2022-11-18 DIAGNOSIS — Z95.828 HISTORY OF ENDOVASCULAR STENT GRAFT FOR ABDOMINAL AORTIC ANEURYSM: ICD-10-CM

## 2022-11-18 PROCEDURE — 74174 CTA ABD&PLVS W/CONTRAST: CPT

## 2022-11-18 PROCEDURE — 6360000004 HC RX CONTRAST MEDICATION: Performed by: RADIOLOGY

## 2022-11-18 RX ADMIN — IOPAMIDOL 75 ML: 755 INJECTION, SOLUTION INTRAVENOUS at 16:18

## 2022-11-21 ENCOUNTER — TELEPHONE (OUTPATIENT)
Dept: VASCULAR SURGERY | Age: 62
End: 2022-11-21

## 2022-11-22 NOTE — TELEPHONE ENCOUNTER
Called patient and reviewed CAT scan with him. No evidence for endovascular leak. Follow-up 6 months.

## 2022-12-31 ENCOUNTER — HOSPITAL ENCOUNTER (OUTPATIENT)
Dept: CT IMAGING | Age: 62
End: 2022-12-31
Payer: COMMERCIAL

## 2022-12-31 DIAGNOSIS — C34.12 SQUAMOUS CELL CARCINOMA OF BRONCHUS IN LEFT UPPER LOBE (HCC): ICD-10-CM

## 2022-12-31 PROCEDURE — 6360000004 HC RX CONTRAST MEDICATION: Performed by: RADIOLOGY

## 2022-12-31 PROCEDURE — 71260 CT THORAX DX C+: CPT

## 2022-12-31 RX ADMIN — IOPAMIDOL 75 ML: 755 INJECTION, SOLUTION INTRAVENOUS at 10:23

## 2022-12-31 NOTE — PROGRESS NOTES
Consulted for PIV placement. 22g PIV placed via US to [address] x2 attempts. Dry sterile dressing applied. Flushed with 10cc normal saline, brisk blood return noted.

## 2023-05-02 ENCOUNTER — OFFICE VISIT (OUTPATIENT)
Dept: VASCULAR SURGERY | Age: 63
End: 2023-05-02
Payer: COMMERCIAL

## 2023-05-02 DIAGNOSIS — Z95.828 S/P AAA REPAIR USING BIFURCATION GRAFT: Primary | ICD-10-CM

## 2023-05-02 DIAGNOSIS — Z86.79 S/P AAA REPAIR USING BIFURCATION GRAFT: Primary | ICD-10-CM

## 2023-05-02 PROCEDURE — 99213 OFFICE O/P EST LOW 20 MIN: CPT | Performed by: SURGERY

## 2023-05-02 NOTE — PROGRESS NOTES
Vascular Surgery Outpatient Progress Note      Chief Complaint   Patient presents with    Circulatory Problem     Follow up AAA. HISTORY OF PRESENT ILLNESS:                The patient is a 57 y.o. male who returns for follow-up evaluation of history of endovascular aneurysm repair. He continues on immunotherapy for history of lung cancer. He denies any other new medical problems or hospitalizations. Past Medical History:        Diagnosis Date    Cancer ([facility] [address]     lung    Diabetes mellitus ([facility] [address]     Hyperlipidemia      Past Surgical History:        Procedure Laterality Date    ABDOMINAL AORTIC ANEURYSM REPAIR, ENDOVASCULAR Bilateral 10/21/2022    ABDOMINAL AORTIC ANEURYSM REPAIR ENDOVASCULAR performed by Hollie Bumpers, MD at [address] [place] [address], PARTIAL      TONSILLECTOMY       Current Medications:   Prior to Admission medications    Medication Sig Start Date End Date Taking?  Authorizing Provider   rosuvastatin (CRESTOR) 10 MG tablet Take 1 tablet by mouth daily 6/6/22  Yes Historical Provider, MD   metFORMIN (GLUCOPHAGE) 500 MG tablet Take 1 tablet by mouth 2 times daily 12/16/21  Yes Historical Provider, MD     Allergies:  Penicillins    Social History     Socioeconomic History    Marital status:      Spouse name: Not on file    Number of children: Not on file    Years of education: Not on file    Highest education level: Not on file   Occupational History    Not on file   Tobacco Use    Smoking status: Former     Packs/day: 1.00     Years: 47.00     Pack years: 47.00     Types: Cigarettes     Start date: 0     Quit date: 06/2019     Years since quitting: 3.9    Smokeless tobacco: Never    Tobacco comments:     Per CT Lung Screening order   Vaping Use    Vaping Use: Never used   Substance and Sexual Activity    Alcohol use: Not Currently    Drug use: Not Currently    Sexual activity: Not on file   Other Topics Concern    Not on file

## 2023-05-20 ENCOUNTER — HOSPITAL ENCOUNTER (OUTPATIENT)
Dept: ULTRASOUND IMAGING | Age: 63
Discharge: HOME | End: 2023-05-20
Payer: COMMERCIAL

## 2023-05-20 DIAGNOSIS — R09.89 OTHER SPECIFIED SYMPTOMS AND SIGNS INVOLVING THE CIRCULATORY AND RESPIRATORY SYSTEMS: ICD-10-CM

## 2023-05-20 PROCEDURE — 93880 EXTRACRANIAL BILAT STUDY: CPT

## 2023-06-30 ENCOUNTER — HOSPITAL ENCOUNTER (OUTPATIENT)
Dept: CT IMAGING | Age: 63
Discharge: HOME | End: 2023-06-30
Payer: COMMERCIAL

## 2023-06-30 DIAGNOSIS — C34.12 MALIGNANT NEOPLASM OF UPPER LOBE, LEFT BRONCHUS OR LUNG (HCC): ICD-10-CM

## 2023-06-30 DIAGNOSIS — G62.0 DRUG-INDUCED POLYNEUROPATHY (HCC): ICD-10-CM

## 2023-06-30 PROCEDURE — 6360000004 HC RX CONTRAST MEDICATION: Performed by: RADIOLOGY

## 2023-06-30 PROCEDURE — 71260 CT THORAX DX C+: CPT

## 2023-06-30 RX ADMIN — IOPAMIDOL 75 ML: 755 INJECTION, SOLUTION INTRAVENOUS at 12:29

## 2023-08-19 LAB
MICROORGANISM SPEC CULT: ABNORMAL
MICROORGANISM/AGENT SPEC: ABNORMAL
SERVICE CMNT-IMP: ABNORMAL
SPECIMEN DESCRIPTION: ABNORMAL

## 2023-08-21 LAB
MICROORGANISM SPEC CULT: ABNORMAL
MICROORGANISM SPEC CULT: ABNORMAL
MICROORGANISM/AGENT SPEC: ABNORMAL
SERVICE CMNT-IMP: ABNORMAL
SPECIMEN DESCRIPTION: ABNORMAL

## 2023-10-06 ENCOUNTER — HOSPITAL ENCOUNTER (OUTPATIENT)
Age: 63
Discharge: HOME | End: 2023-10-08

## 2023-10-06 PROCEDURE — 88305 TISSUE EXAM BY PATHOLOGIST: CPT

## 2023-10-11 LAB — SURGICAL PATHOLOGY REPORT: NORMAL

## 2023-10-24 DIAGNOSIS — I71.43 INFRARENAL ABDOMINAL AORTIC ANEURYSM (AAA) WITHOUT RUPTURE (HCC): Primary | ICD-10-CM

## 2023-10-28 ENCOUNTER — HOSPITAL ENCOUNTER (OUTPATIENT)
Age: 63
Discharge: HOME | End: 2023-10-28
Attending: SURGERY
Payer: COMMERCIAL

## 2023-10-28 ENCOUNTER — HOSPITAL ENCOUNTER (OUTPATIENT)
Dept: CT IMAGING | Age: 63
End: 2023-10-28
Attending: SURGERY
Payer: COMMERCIAL

## 2023-10-28 DIAGNOSIS — Z86.79 S/P AAA REPAIR USING BIFURCATION GRAFT: ICD-10-CM

## 2023-10-28 DIAGNOSIS — Z95.828 S/P AAA REPAIR USING BIFURCATION GRAFT: ICD-10-CM

## 2023-10-28 DIAGNOSIS — I71.43 INFRARENAL ABDOMINAL AORTIC ANEURYSM (AAA) WITHOUT RUPTURE (HCC): ICD-10-CM

## 2023-10-28 LAB
BUN SERPL-MCNC: 22 MG/DL (ref 6–23)
CREAT SERPL-MCNC: 1.2 MG/DL (ref 0.7–1.2)
GFR SERPL CREATININE-BSD FRML MDRD: >60 ML/MIN/1.73M2

## 2023-10-28 PROCEDURE — 82565 ASSAY OF CREATININE: CPT

## 2023-10-28 PROCEDURE — 74174 CTA ABD&PLVS W/CONTRAST: CPT

## 2023-10-28 PROCEDURE — 6360000004 HC RX CONTRAST MEDICATION: Performed by: RADIOLOGY

## 2023-10-28 PROCEDURE — 84520 ASSAY OF UREA NITROGEN: CPT

## 2023-10-28 PROCEDURE — 36415 COLL VENOUS BLD VENIPUNCTURE: CPT

## 2023-10-28 RX ADMIN — IOPAMIDOL 75 ML: 755 INJECTION, SOLUTION INTRAVENOUS at 11:35

## 2023-11-07 ENCOUNTER — OFFICE VISIT (OUTPATIENT)
Dept: VASCULAR SURGERY | Age: 63
End: 2023-11-07
Payer: COMMERCIAL

## 2023-11-07 VITALS — WEIGHT: 160 LBS | BODY MASS INDEX: 24.33 KG/M2

## 2023-11-07 DIAGNOSIS — Z95.828 S/P AAA REPAIR USING BIFURCATION GRAFT: Primary | ICD-10-CM

## 2023-11-07 DIAGNOSIS — Z86.79 S/P AAA REPAIR USING BIFURCATION GRAFT: Primary | ICD-10-CM

## 2023-11-07 PROCEDURE — 1123F ACP DISCUSS/DSCN MKR DOCD: CPT | Performed by: SURGERY

## 2023-11-07 PROCEDURE — 99213 OFFICE O/P EST LOW 20 MIN: CPT | Performed by: SURGERY

## 2023-11-07 NOTE — PROGRESS NOTES
Vascular Surgery Outpatient Progress Note      Chief Complaint   Patient presents with    Check-Up     S/p aaa       HISTORY OF PRESENT ILLNESS:                The patient is a 70 y.o. male who returns for follow-up evaluation of endovascular aneurysm repair 2022. The patient states that he underwent an MRI of his prostate for elevated PSA levels. He denies any other problems. Past Medical History:        Diagnosis Date    Cancer ([facility])     lung    Diabetes mellitus ([facility])     Hyperlipidemia      Past Surgical History:        Procedure Laterality Date    ABDOMINAL AORTIC ANEURYSM REPAIR, ENDOVASCULAR Bilateral 10/21/2022    ABDOMINAL AORTIC ANEURYSM REPAIR ENDOVASCULAR performed by Dung Black MD at [address] [place] [address], PARTIAL      TONSILLECTOMY       Current Medications:   Prior to Admission medications    Medication Sig Start Date End Date Taking?  Authorizing Provider   rosuvastatin (CRESTOR) 10 MG tablet Take 1 tablet by mouth daily 22  Yes ProviderFrancisca MD   metFORMIN (GLUCOPHAGE) 500 MG tablet Take 1 tablet by mouth 2 times daily 21  Yes ProviderFrancisca MD     Allergies:  Penicillins    Social History     Socioeconomic History    Marital status:      Spouse name: Not on file    Number of children: Not on file    Years of education: Not on file    Highest education level: Not on file   Occupational History    Not on file   Tobacco Use    Smoking status: Former     Packs/day: 1.00     Years: 47.00     Additional pack years: 0.00     Total pack years: 47.00     Types: Cigarettes     Start date: 0     Quit date: 2019     Years since quittin.4    Smokeless tobacco: Never    Tobacco comments:     Per CT Lung Screening order   Vaping Use    Vaping Use: Never used   Substance and Sexual Activity    Alcohol use: Not Currently    Drug use: Not Currently    Sexual activity: Not on file   Other Topics

## 2024-01-22 ENCOUNTER — HOSPITAL ENCOUNTER (OUTPATIENT)
Dept: CT IMAGING | Age: 64
Discharge: HOME | End: 2024-01-24
Attending: INTERNAL MEDICINE
Payer: MEDICARE

## 2024-01-22 DIAGNOSIS — C34.12 MALIGNANT NEOPLASM OF UPPER LOBE, LEFT BRONCHUS OR LUNG (HCC): ICD-10-CM

## 2024-01-22 DIAGNOSIS — G62.0 DRUG-INDUCED POLYNEUROPATHY (HCC): ICD-10-CM

## 2024-01-22 LAB
BUN SERPL-MCNC: 21 MG/DL (ref 6–23)
CREAT SERPL-MCNC: 1.3 MG/DL (ref 0.7–1.2)
GFR SERPL CREATININE-BSD FRML MDRD: >60 ML/MIN/1.73M2

## 2024-01-22 PROCEDURE — 36415 COLL VENOUS BLD VENIPUNCTURE: CPT

## 2024-01-22 PROCEDURE — 84520 ASSAY OF UREA NITROGEN: CPT

## 2024-01-22 PROCEDURE — 71260 CT THORAX DX C+: CPT

## 2024-01-22 PROCEDURE — 6360000004 HC RX CONTRAST MEDICATION: Performed by: RADIOLOGY

## 2024-01-22 PROCEDURE — 82565 ASSAY OF CREATININE: CPT

## 2024-01-22 RX ADMIN — IOPAMIDOL 75 ML: 755 INJECTION, SOLUTION INTRAVENOUS at 16:36

## 2024-08-21 ENCOUNTER — TRANSCRIBE ORDERS (OUTPATIENT)
Dept: ADMINISTRATIVE | Age: 64
End: 2024-08-21

## 2024-08-21 DIAGNOSIS — C34.12 MALIGNANT NEOPLASM OF UPPER LOBE BRONCHUS, LEFT (HCC): Primary | ICD-10-CM

## 2024-09-13 ENCOUNTER — HOSPITAL ENCOUNTER (OUTPATIENT)
Age: 64
Discharge: HOME | End: 2024-09-13
Attending: INTERNAL MEDICINE
Payer: MEDICARE

## 2024-09-13 ENCOUNTER — HOSPITAL ENCOUNTER (OUTPATIENT)
Dept: CT IMAGING | Age: 64
Discharge: HOME | End: 2024-09-15
Attending: INTERNAL MEDICINE
Payer: MEDICARE

## 2024-09-13 DIAGNOSIS — C34.12 MALIGNANT NEOPLASM OF UPPER LOBE BRONCHUS, LEFT (HCC): ICD-10-CM

## 2024-09-13 LAB
BUN SERPL-MCNC: 19 MG/DL (ref 6–23)
CREAT SERPL-MCNC: 1.1 MG/DL (ref 0.7–1.2)
GFR, ESTIMATED: 79 ML/MIN/1.73M2

## 2024-09-13 PROCEDURE — 6360000004 HC RX CONTRAST MEDICATION: Performed by: RADIOLOGY

## 2024-09-13 PROCEDURE — 84520 ASSAY OF UREA NITROGEN: CPT

## 2024-09-13 PROCEDURE — 36415 COLL VENOUS BLD VENIPUNCTURE: CPT

## 2024-09-13 PROCEDURE — 71260 CT THORAX DX C+: CPT

## 2024-09-13 PROCEDURE — 82565 ASSAY OF CREATININE: CPT

## 2024-09-13 RX ORDER — IOPAMIDOL 755 MG/ML
75 INJECTION, SOLUTION INTRAVASCULAR
Status: COMPLETED | OUTPATIENT
Start: 2024-09-13 | End: 2024-09-13

## 2024-09-13 RX ADMIN — IOPAMIDOL 75 ML: 755 INJECTION, SOLUTION INTRAVENOUS at 08:37

## 2024-10-12 ENCOUNTER — HOSPITAL ENCOUNTER (OUTPATIENT)
Age: 64
Discharge: HOME | End: 2024-10-12
Payer: MEDICARE

## 2024-10-12 PROCEDURE — 84270 ASSAY OF SEX HORMONE GLOBUL: CPT

## 2024-10-12 PROCEDURE — 36415 COLL VENOUS BLD VENIPUNCTURE: CPT

## 2024-10-12 PROCEDURE — 84403 ASSAY OF TOTAL TESTOSTERONE: CPT

## 2024-10-15 LAB
SHBG SERPL-SCNC: 31 NMOL/L (ref 19–76)
TESTOST FREE MFR SERPL: 48.4 PG/ML (ref 47–244)
TESTOST SERPL-MCNC: 242 NG/DL (ref 193–740)

## 2024-11-07 DIAGNOSIS — Z86.79 S/P AAA REPAIR USING BIFURCATION GRAFT: Primary | ICD-10-CM

## 2024-11-07 DIAGNOSIS — I71.43 INFRARENAL ABDOMINAL AORTIC ANEURYSM (AAA) WITHOUT RUPTURE (HCC): ICD-10-CM

## 2024-11-07 DIAGNOSIS — Z95.828 S/P AAA REPAIR USING BIFURCATION GRAFT: Primary | ICD-10-CM

## 2024-11-26 ENCOUNTER — HOSPITAL ENCOUNTER (OUTPATIENT)
Dept: CARDIOLOGY | Age: 64
Discharge: HOME | End: 2024-11-28
Attending: SURGERY
Payer: MEDICARE

## 2024-11-26 ENCOUNTER — OFFICE VISIT (OUTPATIENT)
Dept: VASCULAR SURGERY | Age: 64
End: 2024-11-26
Payer: MEDICARE

## 2024-11-26 VITALS — BODY MASS INDEX: 24.63 KG/M2 | WEIGHT: 162 LBS

## 2024-11-26 DIAGNOSIS — Z86.79 S/P AAA REPAIR USING BIFURCATION GRAFT: ICD-10-CM

## 2024-11-26 DIAGNOSIS — Z86.79 S/P AAA REPAIR USING BIFURCATION GRAFT: Primary | ICD-10-CM

## 2024-11-26 DIAGNOSIS — I71.43 INFRARENAL ABDOMINAL AORTIC ANEURYSM (AAA) WITHOUT RUPTURE (HCC): ICD-10-CM

## 2024-11-26 DIAGNOSIS — Z95.828 S/P AAA REPAIR USING BIFURCATION GRAFT: Primary | ICD-10-CM

## 2024-11-26 DIAGNOSIS — Z95.828 S/P AAA REPAIR USING BIFURCATION GRAFT: ICD-10-CM

## 2024-11-26 LAB
VAS AORTA DIST AP: 4.36 CM
VAS AORTA DIST TR: 4.23 CM
VAS AORTA MID AP: 2.04 CM
VAS AORTA MID TRANS: 2.14 CM
VAS AORTA PROX AP: 2.27 CM
VAS AORTA PROX TR: 2.26 CM

## 2024-11-26 PROCEDURE — 93976 VASCULAR STUDY: CPT | Performed by: SURGERY

## 2024-11-26 PROCEDURE — 1123F ACP DISCUSS/DSCN MKR DOCD: CPT | Performed by: SURGERY

## 2024-11-26 PROCEDURE — 99213 OFFICE O/P EST LOW 20 MIN: CPT | Performed by: SURGERY

## 2024-11-26 PROCEDURE — 1159F MED LIST DOCD IN RCRD: CPT | Performed by: SURGERY

## 2024-11-26 PROCEDURE — 93976 VASCULAR STUDY: CPT

## 2024-11-26 NOTE — PROGRESS NOTES
[x]/Yes []  Skin:              Rash:    No [x]/Yes []      Ulcers:   No [x]/Yes []              Abnorm pigment: No [x]/Yes []  :              Frequency/urgency:  No [x]/Yes []      Hematuria:    No [x]/Yes []                      Incontinence:    No [x]/Yes []    PHYSICAL EXAM:  There were no vitals filed for this visit.  General Appearance: alert and oriented to person, place and time, in no acute distress, well developed and well- nourished  Neurologic: no cranial nerve deficit, speech normal  Head: normocephalic and atraumatic  Eyes: extraocular eye movements intact, conjunctivae normal  ENT: external ear and ear canal normal bilaterally, nose without deformity, B carotid bruits  Pulmonary/Chest: normal air movement, no respiratory distress  Cardiovascular: normal rate, regular rhythm, 4/6 SE murmur  Abdomen: non-distended, no masses  Musculoskeletal: no joint deformity or tenderness  Extremities: no leg edema bilaterally  Skin: warm and dry, no rash or erythema    PULSE EXAM      Right      Left   Brachial     Radial 3 3   Femoral     Popliteal     Dorsalis Pedis     Posterior Tibial 3 3   (3=normal, 2=diminished, 1=barely palpable, 4=widened)    RADIOLOGY: [facility] today    Problem List Items Addressed This Visit       S/P AAA repair using bifurcation graft - Primary    Relevant Orders    Vascular duplex abdominal aorta       We repeated an ultrasound of the aorta that measures the aneurysm at 4.4 cm without evidence for endovascular leak.    I reviewed with the patient that from my standpoint he can continue with all normal activities.  The circulation to his feet remains excellent and he does not have a problem with lack of circulation contributing to his cold feet.  I will plan to see him again in one year but asked him to call sooner with any new problems.      Return in about 1 year (around 11/26/2025) for testing.

## 2025-02-22 ENCOUNTER — HOSPITAL ENCOUNTER (OUTPATIENT)
Age: 65
Discharge: HOME | End: 2025-02-22
Payer: MEDICARE

## 2025-02-22 LAB
ALBUMIN SERPL-MCNC: 4.5 G/DL (ref 3.5–5.2)
ALP SERPL-CCNC: 62 U/L (ref 40–129)
ALT SERPL-CCNC: <5 U/L (ref 0–40)
ANION GAP SERPL CALCULATED.3IONS-SCNC: 10 MMOL/L (ref 7–16)
AST SERPL-CCNC: 26 U/L (ref 0–39)
BASOPHILS # BLD: 0.03 K/UL (ref 0–0.2)
BASOPHILS NFR BLD: 1 % (ref 0–2)
BILIRUB SERPL-MCNC: 0.8 MG/DL (ref 0–1.2)
BUN SERPL-MCNC: 25 MG/DL (ref 6–23)
CALCIUM SERPL-MCNC: 9.8 MG/DL (ref 8.6–10.2)
CHLORIDE SERPL-SCNC: 101 MMOL/L (ref 98–107)
CHOLEST SERPL-MCNC: 127 MG/DL
CO2 SERPL-SCNC: 28 MMOL/L (ref 22–29)
CREAT SERPL-MCNC: 1.2 MG/DL (ref 0.7–1.2)
CREAT UR-MCNC: 103.1 MG/DL (ref 40–278)
EOSINOPHIL # BLD: 0.07 K/UL (ref 0.05–0.5)
EOSINOPHILS RELATIVE PERCENT: 1 % (ref 0–6)
ERYTHROCYTE [DISTWIDTH] IN BLOOD BY AUTOMATED COUNT: 13 % (ref 11.5–15)
GFR, ESTIMATED: 69 ML/MIN/1.73M2
GLUCOSE SERPL-MCNC: 151 MG/DL (ref 74–99)
HBA1C MFR BLD: 6.9 % (ref 4–5.6)
HCT VFR BLD AUTO: 41.6 % (ref 37–54)
HDLC SERPL-MCNC: 35 MG/DL
HGB BLD-MCNC: 14.1 G/DL (ref 12.5–16.5)
IMM GRANULOCYTES # BLD AUTO: <0.03 K/UL (ref 0–0.58)
IMM GRANULOCYTES NFR BLD: 0 % (ref 0–5)
LDLC SERPL CALC-MCNC: 72 MG/DL
LYMPHOCYTES NFR BLD: 1.63 K/UL (ref 1.5–4)
LYMPHOCYTES RELATIVE PERCENT: 26 % (ref 20–42)
MCH RBC QN AUTO: 29.3 PG (ref 26–35)
MCHC RBC AUTO-ENTMCNC: 33.9 G/DL (ref 32–34.5)
MCV RBC AUTO: 86.5 FL (ref 80–99.9)
MICROALBUMIN UR-MCNC: <12 MG/L (ref 0–19)
MICROALBUMIN/CREAT UR-RTO: NORMAL MCG/MG CREAT (ref 0–30)
MONOCYTES NFR BLD: 0.72 K/UL (ref 0.1–0.95)
MONOCYTES NFR BLD: 12 % (ref 2–12)
NEUTROPHILS NFR BLD: 60 % (ref 43–80)
NEUTS SEG NFR BLD: 3.75 K/UL (ref 1.8–7.3)
PLATELET # BLD AUTO: 294 K/UL (ref 130–450)
PMV BLD AUTO: 9.5 FL (ref 7–12)
POTASSIUM SERPL-SCNC: 4.8 MMOL/L (ref 3.5–5)
PROT SERPL-MCNC: 7 G/DL (ref 6.4–8.3)
PSA SERPL-MCNC: 5.91 NG/ML (ref 0–4)
RBC # BLD AUTO: 4.81 M/UL (ref 3.8–5.8)
SODIUM SERPL-SCNC: 139 MMOL/L (ref 132–146)
TRIGL SERPL-MCNC: 100 MG/DL
VLDLC SERPL CALC-MCNC: 20 MG/DL
WBC OTHER # BLD: 6.2 K/UL (ref 4.5–11.5)

## 2025-02-22 PROCEDURE — 82043 UR ALBUMIN QUANTITATIVE: CPT

## 2025-02-22 PROCEDURE — 80053 COMPREHEN METABOLIC PANEL: CPT

## 2025-02-22 PROCEDURE — 84153 ASSAY OF PSA TOTAL: CPT

## 2025-02-22 PROCEDURE — 80061 LIPID PANEL: CPT

## 2025-02-22 PROCEDURE — 85025 COMPLETE CBC W/AUTO DIFF WBC: CPT

## 2025-02-22 PROCEDURE — 36415 COLL VENOUS BLD VENIPUNCTURE: CPT

## 2025-02-22 PROCEDURE — 83036 HEMOGLOBIN GLYCOSYLATED A1C: CPT

## 2025-02-22 PROCEDURE — 82570 ASSAY OF URINE CREATININE: CPT

## 2025-03-11 ENCOUNTER — TRANSCRIBE ORDERS (OUTPATIENT)
Dept: ADMINISTRATIVE | Age: 65
End: 2025-03-11

## 2025-03-11 DIAGNOSIS — C34.12 MALIGNANT NEOPLASM OF UPPER LOBE OF LEFT LUNG (HCC): Primary | ICD-10-CM

## 2025-03-11 DIAGNOSIS — G62.0 DRUG-INDUCED POLYNEUROPATHY: ICD-10-CM

## 2025-04-18 ENCOUNTER — HOSPITAL ENCOUNTER (OUTPATIENT)
Dept: CT IMAGING | Age: 65
Discharge: HOME | End: 2025-04-20
Attending: INTERNAL MEDICINE
Payer: MEDICARE

## 2025-04-18 ENCOUNTER — HOSPITAL ENCOUNTER (OUTPATIENT)
Age: 65
Discharge: HOME | End: 2025-04-18
Attending: INTERNAL MEDICINE
Payer: MEDICARE

## 2025-04-18 DIAGNOSIS — G62.0 DRUG-INDUCED POLYNEUROPATHY: ICD-10-CM

## 2025-04-18 DIAGNOSIS — C34.12 MALIGNANT NEOPLASM OF UPPER LOBE OF LEFT LUNG (HCC): ICD-10-CM

## 2025-04-18 LAB
BUN SERPL-MCNC: 30 MG/DL (ref 6–23)
CREAT SERPL-MCNC: 1.1 MG/DL (ref 0.7–1.2)
GFR, ESTIMATED: 72 ML/MIN/1.73M2

## 2025-04-18 PROCEDURE — 36415 COLL VENOUS BLD VENIPUNCTURE: CPT

## 2025-04-18 PROCEDURE — 82565 ASSAY OF CREATININE: CPT

## 2025-04-18 PROCEDURE — 84520 ASSAY OF UREA NITROGEN: CPT

## 2025-04-18 PROCEDURE — 71260 CT THORAX DX C+: CPT

## 2025-04-18 PROCEDURE — 6360000004 HC RX CONTRAST MEDICATION: Performed by: RADIOLOGY

## 2025-04-18 RX ORDER — IOPAMIDOL 755 MG/ML
75 INJECTION, SOLUTION INTRAVASCULAR
Status: COMPLETED | OUTPATIENT
Start: 2025-04-18 | End: 2025-04-18

## 2025-04-18 RX ADMIN — IOPAMIDOL 75 ML: 755 INJECTION, SOLUTION INTRAVENOUS at 08:38

## 2025-05-09 ENCOUNTER — TRANSCRIBE ORDERS (OUTPATIENT)
Dept: ADMINISTRATIVE | Age: 65
End: 2025-05-09

## 2025-05-09 DIAGNOSIS — G62.0: ICD-10-CM

## 2025-05-09 DIAGNOSIS — C34.12 MALIGNANT NEOPLASM OF UPPER LOBE BRONCHUS, LEFT (HCC): Primary | ICD-10-CM

## (undated) DEVICE — ADVANCE, 35LP LOW PROFILE PTA BALLOON DILATATION CATHETER: Brand: ADVANCE

## (undated) DEVICE — 18GA (1.3MM OD: 1.0MM ID) X 7CM INTRODUCER18GA X 7CM NEEDLENEEDLE: Brand: INTRODUCER NEEDLEINTRODUCER NEEDLE

## (undated) DEVICE — 5F (1.0MM ID) X 9CM5F (1.0MM ID) REGULAR MICRO-STICK® INTRODUCER SETINTRODUCER SET WITH NITINOL GUIDEWIREWITH NITIN WITH RADIOPAQUE TIPWITH RADIOPAQ: Brand: MICRO-STICK SETMICRO-STICK SET

## (undated) DEVICE — SOLUTION IV IRRIG 1000ML POUR BTL 2F7114

## (undated) DEVICE — APPLICATOR MEDICATED 26 CC SOLUTION HI LT ORNG CHLORAPREP

## (undated) DEVICE — GOWN,SIRUS,FABRNF,L,20/CS: Brand: MEDLINE

## (undated) DEVICE — 3M™ STERI-DRAPE™ INCISE DRAPE 1050 (60CM X 45CM): Brand: STERI-DRAPE™

## (undated) DEVICE — CATHETER VASC DIAG MOD PERIPH W/O HYDRPHLC COAT AD

## (undated) DEVICE — DRAPE EQUIP BANDED BG 36X28 IN W/ROUNDED CORNER SNAPKOVER

## (undated) DEVICE — RADIFOCUS GLIDEWIRE ADVANTAGE GUIDEWIRE: Brand: GLIDEWIRE ADVANTAGE

## (undated) DEVICE — SODIUM CHL 09% SOL EXCEL

## (undated) DEVICE — BEACON TIP TORCON NB ADVANTAGE CATHETER: Brand: BEACON TIP TORCON NB

## (undated) DEVICE — KIT MED IMAG CNTRST AGNT W/ IOPAMIDOL REUSE

## (undated) DEVICE — MEDIA CONTRAST ISOVUE 370 100ML

## (undated) DEVICE — Device

## (undated) DEVICE — DECANTER BAG 9": Brand: MEDLINE INDUSTRIES, INC.

## (undated) DEVICE — INTRODUCER SHTH 6FR L12CM DIA0.038IN HEMSTAS CLOSE TOL

## (undated) DEVICE — ENDOVASCULAR: Brand: MEDLINE INDUSTRIES, INC.

## (undated) DEVICE — ELECTRODE PT RET AD L9FT HI MOIST COND ADH HYDRGEL CORDED

## (undated) DEVICE — BENTSON WIRE GUIDE 20CM DISTAL FLEXIBILITY WITH SOFTENED TIP: Brand: BENTSON

## (undated) DEVICE — FIXED CORE WIRE GUIDE SAFE-T-J, CURVED: Brand: COOK

## (undated) DEVICE — MEDIA CONTRAST RX ISOVUE-300 61% 30ML VIALS

## (undated) DEVICE — SHEATH INTRO 8FR L12CM GWIRE 0.038IN W/ SMOOTH TAPERS TIP

## (undated) DEVICE — TUBING ANGIO L48IN POLYUR HI PRSS 1200PSI AIRLESS ROT ADPT

## (undated) DEVICE — CATHETER ANGIO AD 5FR L65CM DIA0.046IN GWIRE 0.035IN BERN

## (undated) DEVICE — KIT MFLD ISOLATN NACL CNTRST PRT TBNG SPIK W/ PRSS TRNSDUC

## (undated) DEVICE — TOWEL,OR,DSP,ST,BLUE,STD,6/PK,12PK/CS: Brand: MEDLINE

## (undated) DEVICE — KIT HND CTRL 3 W STPCOCK ROT END 54IN PREM HI PRSS TBNG AT

## (undated) DEVICE — GLOVE SURG SZ 75 L12IN FNGR THK79MIL GRN LTX FREE

## (undated) DEVICE — LOOP VES W25MM THK1MM MAXI RED SIL FLD REPELLENT 100 PER

## (undated) DEVICE — PERCLOSE PROGLIDE™ SUTURE-MEDIATED CLOSURE SYSTEM: Brand: PERCLOSE PROGLIDE™

## (undated) DEVICE — INTRODUCER HEMSTAS 6FRX5CM SHTH W/ .035IN GWIRE ULTIMUM EV 407649] ST JUDE MED CARDIOVASCULAR DIV]

## (undated) DEVICE — TUBING, SUCTION, 3/16" X 12', STRAIGHT: Brand: MEDLINE

## (undated) DEVICE — SYRINGE, LUER LOCK, 10ML: Brand: MEDLINE

## (undated) DEVICE — 18 GA N.G. KIT, 10 PACK: Brand: SITE-RITE

## (undated) DEVICE — GUIDEWIRE VASC STR 0.035 INX180 CM 15 CM BENT PTFE COAT STRT

## (undated) DEVICE — NEEDLE SYR 18GA L1.5IN RED PLAS HUB S STL BLNT FILL W/O

## (undated) DEVICE — CODA, LP BALLOON CATHETER: Brand: CODA

## (undated) DEVICE — GOWN,AURORA,NONREINF,RAGLAN,L,STERILE: Brand: MEDLINE

## (undated) DEVICE — TFE COATED AMPLATZ ULTRA STIFF WIRE GUIDE - CATHETER EXCHANGE: Brand: AMPLATZ